# Patient Record
Sex: MALE | Race: WHITE | URBAN - METROPOLITAN AREA
[De-identification: names, ages, dates, MRNs, and addresses within clinical notes are randomized per-mention and may not be internally consistent; named-entity substitution may affect disease eponyms.]

---

## 2019-07-31 ENCOUNTER — HISTORICAL (OUTPATIENT)
Dept: RADIOLOGY | Facility: HOSPITAL | Age: 64
End: 2019-07-31

## 2024-06-04 ENCOUNTER — HOSPITAL ENCOUNTER (OUTPATIENT)
Facility: HOSPITAL | Age: 69
Discharge: HOME | End: 2024-06-04
Attending: INTERNAL MEDICINE | Admitting: INTERNAL MEDICINE
Payer: MEDICARE

## 2024-06-04 DIAGNOSIS — I35.0 AORTIC STENOSIS: Primary | ICD-10-CM

## 2024-06-04 LAB
CATH EF QUANTITATIVE: 65 %
OHS QRS DURATION: 136 MS
OHS QTC CALCULATION: 464 MS

## 2024-06-04 PROCEDURE — C1894 INTRO/SHEATH, NON-LASER: HCPCS | Performed by: INTERNAL MEDICINE

## 2024-06-04 PROCEDURE — 99153 MOD SED SAME PHYS/QHP EA: CPT | Performed by: INTERNAL MEDICINE

## 2024-06-04 PROCEDURE — 27201423 OPTIME MED/SURG SUP & DEVICES STERILE SUPPLY: Performed by: INTERNAL MEDICINE

## 2024-06-04 PROCEDURE — 99152 MOD SED SAME PHYS/QHP 5/>YRS: CPT | Performed by: INTERNAL MEDICINE

## 2024-06-04 PROCEDURE — 93010 ELECTROCARDIOGRAM REPORT: CPT | Mod: ,,, | Performed by: INTERNAL MEDICINE

## 2024-06-04 PROCEDURE — 25000003 PHARM REV CODE 250: Performed by: INTERNAL MEDICINE

## 2024-06-04 PROCEDURE — 93005 ELECTROCARDIOGRAM TRACING: CPT | Mod: 59

## 2024-06-04 PROCEDURE — 93567 NJX CAR CTH SPRVLV AORTGRPHY: CPT | Performed by: INTERNAL MEDICINE

## 2024-06-04 PROCEDURE — C1751 CATH, INF, PER/CENT/MIDLINE: HCPCS | Performed by: INTERNAL MEDICINE

## 2024-06-04 PROCEDURE — C1760 CLOSURE DEV, VASC: HCPCS | Performed by: INTERNAL MEDICINE

## 2024-06-04 PROCEDURE — 63600175 PHARM REV CODE 636 W HCPCS: Performed by: INTERNAL MEDICINE

## 2024-06-04 PROCEDURE — C1769 GUIDE WIRE: HCPCS | Performed by: INTERNAL MEDICINE

## 2024-06-04 PROCEDURE — 93460 R&L HRT ART/VENTRICLE ANGIO: CPT | Performed by: INTERNAL MEDICINE

## 2024-06-04 PROCEDURE — 25500020 PHARM REV CODE 255: Performed by: INTERNAL MEDICINE

## 2024-06-04 PROCEDURE — 93458 L HRT ARTERY/VENTRICLE ANGIO: CPT | Performed by: INTERNAL MEDICINE

## 2024-06-04 DEVICE — ANGIO-SEAL VIP VASCULAR CLOSURE DEVICE
Type: IMPLANTABLE DEVICE | Site: GROIN | Status: FUNCTIONAL
Brand: ANGIO-SEAL

## 2024-06-04 RX ORDER — ROSUVASTATIN CALCIUM 5 MG/1
5 TABLET, COATED ORAL DAILY
COMMUNITY
Start: 2024-04-11

## 2024-06-04 RX ORDER — MV/FA/DHA/EPA/FISH OIL/SAW/GNK 400MCG-200
1 COMBINATION PACKAGE (EA) ORAL
COMMUNITY

## 2024-06-04 RX ORDER — GUAIFENESIN 400 MG/1
400 TABLET ORAL DAILY PRN
COMMUNITY

## 2024-06-04 RX ORDER — UBIDECARENONE 75 MG
500 CAPSULE ORAL DAILY
COMMUNITY

## 2024-06-04 RX ORDER — ZINC GLUCONATE 50 MG
50 TABLET ORAL DAILY
COMMUNITY

## 2024-06-04 RX ORDER — SODIUM CHLORIDE 9 MG/ML
INJECTION, SOLUTION INTRAVENOUS CONTINUOUS
Status: DISCONTINUED | OUTPATIENT
Start: 2024-06-04 | End: 2024-06-04 | Stop reason: HOSPADM

## 2024-06-04 RX ORDER — MIDAZOLAM HYDROCHLORIDE 2 MG/2ML
INJECTION, SOLUTION INTRAMUSCULAR; INTRAVENOUS
Status: DISCONTINUED | OUTPATIENT
Start: 2024-06-04 | End: 2024-06-04 | Stop reason: HOSPADM

## 2024-06-04 RX ORDER — LISINOPRIL 10 MG/1
10 TABLET ORAL DAILY
COMMUNITY
Start: 2024-03-08

## 2024-06-04 RX ORDER — LIDOCAINE HYDROCHLORIDE 10 MG/ML
INJECTION INFILTRATION; PERINEURAL
Status: DISCONTINUED | OUTPATIENT
Start: 2024-06-04 | End: 2024-06-04 | Stop reason: HOSPADM

## 2024-06-04 RX ORDER — CEFAZOLIN SODIUM 1 G/3ML
INJECTION, POWDER, FOR SOLUTION INTRAMUSCULAR; INTRAVENOUS
Status: DISCONTINUED | OUTPATIENT
Start: 2024-06-04 | End: 2024-06-04 | Stop reason: HOSPADM

## 2024-06-04 RX ORDER — APREMILAST 30 MG/1
1 TABLET, FILM COATED ORAL
COMMUNITY

## 2024-06-04 RX ORDER — FENTANYL CITRATE 50 UG/ML
INJECTION, SOLUTION INTRAMUSCULAR; INTRAVENOUS
Status: DISCONTINUED | OUTPATIENT
Start: 2024-06-04 | End: 2024-06-04 | Stop reason: HOSPADM

## 2024-06-04 RX ORDER — SODIUM CHLORIDE 0.9 % (FLUSH) 0.9 %
10 SYRINGE (ML) INJECTION
Status: DISCONTINUED | OUTPATIENT
Start: 2024-06-04 | End: 2024-06-04 | Stop reason: HOSPADM

## 2024-06-04 RX ORDER — ATROPINE SULFATE 0.1 MG/ML
INJECTION INTRAVENOUS
Status: DISCONTINUED
Start: 2024-06-04 | End: 2024-06-04 | Stop reason: HOSPADM

## 2024-06-04 RX ORDER — ACETAMINOPHEN 325 MG/1
650 TABLET ORAL EVERY 4 HOURS PRN
Status: DISCONTINUED | OUTPATIENT
Start: 2024-06-04 | End: 2024-06-04 | Stop reason: HOSPADM

## 2024-06-04 RX ORDER — ASPIRIN 81 MG/1
81 TABLET ORAL DAILY
Status: DISCONTINUED | OUTPATIENT
Start: 2024-06-05 | End: 2024-06-04 | Stop reason: HOSPADM

## 2024-06-04 RX ORDER — DIPHENHYDRAMINE HCL 25 MG
50 CAPSULE ORAL
Status: DISCONTINUED | OUTPATIENT
Start: 2024-06-04 | End: 2024-06-04 | Stop reason: HOSPADM

## 2024-06-04 RX ORDER — DIPHENHYDRAMINE HYDROCHLORIDE 50 MG/ML
INJECTION INTRAMUSCULAR; INTRAVENOUS
Status: DISCONTINUED | OUTPATIENT
Start: 2024-06-04 | End: 2024-06-04 | Stop reason: HOSPADM

## 2024-06-04 RX ORDER — SODIUM CHLORIDE 9 MG/ML
INJECTION, SOLUTION INTRAVENOUS ONCE
Status: COMPLETED | OUTPATIENT
Start: 2024-06-04 | End: 2024-06-04

## 2024-06-04 RX ORDER — PANTOPRAZOLE SODIUM 40 MG/1
40 TABLET, DELAYED RELEASE ORAL DAILY
COMMUNITY
Start: 2024-05-26

## 2024-06-04 RX ORDER — DIAZEPAM 5 MG/1
10 TABLET ORAL
Status: DISCONTINUED | OUTPATIENT
Start: 2024-06-04 | End: 2024-06-04 | Stop reason: HOSPADM

## 2024-06-04 RX ORDER — MAGNESIUM 200 MG
2 TABLET ORAL 2 TIMES DAILY PRN
COMMUNITY

## 2024-06-04 RX ORDER — MULTIVITAMIN
1 TABLET ORAL DAILY
COMMUNITY

## 2024-06-04 RX ORDER — ONDANSETRON 4 MG/1
8 TABLET, ORALLY DISINTEGRATING ORAL EVERY 8 HOURS PRN
Status: DISCONTINUED | OUTPATIENT
Start: 2024-06-04 | End: 2024-06-04 | Stop reason: HOSPADM

## 2024-06-04 RX ORDER — HEPARIN SODIUM 1000 [USP'U]/ML
INJECTION, SOLUTION INTRAVENOUS; SUBCUTANEOUS
Status: DISCONTINUED | OUTPATIENT
Start: 2024-06-04 | End: 2024-06-04 | Stop reason: HOSPADM

## 2024-06-04 RX ADMIN — SODIUM CHLORIDE: 9 INJECTION, SOLUTION INTRAVENOUS at 12:06

## 2024-06-04 RX ADMIN — DIPHENHYDRAMINE HYDROCHLORIDE 50 MG: 25 CAPSULE ORAL at 01:06

## 2024-06-04 RX ADMIN — DIAZEPAM 10 MG: 5 TABLET ORAL at 01:06

## 2024-06-04 NOTE — Clinical Note
The catheter was inserted into the, was removed from the and was inserted over the wire into the aorta and left ventricle. Hemodynamics were performed.  and Pullback was recorded.

## 2024-06-04 NOTE — H&P
Patient name: Lito Durham  MRN: [id number]  : 1955  Cath Lab Procedure H&P Update    Pre-Procedure Assessment:    I saw and examined the patient face to face. The patient has been re-evaluated and his condition is unchanged. The reason for admission, procedure and care is still present.  Based on the patients H&P, pre-procedure physical exam, relevant diagnostic studies, NPO status and information obtained from the patient, I determine the patient is an appropriate candidate for the proposed procedure and anesthesia planned. I further certify the anesthesia risks, benefits and options have been explained to the patient to which he agrees as documented on the procedural consent.    See scanned updated H&P and additional records from media tab.      Rios Paulino

## 2024-06-04 NOTE — DISCHARGE INSTRUCTIONS
Can shower after 24 hours. Remove dressing in 24 hours.  No driving for 48 hours.  Do not submerge site in water for 5 days  No lotion, cream, powder around the site for 5 days.  No lifting more than a gallon of milk for 5 days.  Go to the nearest ER when there is redness, swelling or discharge from the site, increasing pain on the site and you run fever.  If the site bleeds, lay on the ground on put pressure on the right groin for  continuously for 10 minutes and call 911.  Continue medication as prescribed by your physician.

## 2024-06-04 NOTE — Clinical Note
The catheter was inserted into the and was inserted over the wire into the ostium   right coronary artery. Hemodynamics were performed.  An angiography was performed of the right coronary arteries. Multiple views were taken. The angiography was performed via power injection.

## 2024-06-04 NOTE — Clinical Note
The catheter was inserted into the, was removed from the and was inserted over the wire into the ostium   left main. Hemodynamics were performed.  and Pullback was recorded.  An angiography was performed of the left coronary arteries. Multiple views were taken. The angiography was performed via power injection.

## 2024-06-05 VITALS
BODY MASS INDEX: 34.75 KG/M2 | HEART RATE: 85 BPM | SYSTOLIC BLOOD PRESSURE: 115 MMHG | WEIGHT: 216.25 LBS | RESPIRATION RATE: 21 BRPM | HEIGHT: 66 IN | OXYGEN SATURATION: 97 % | DIASTOLIC BLOOD PRESSURE: 74 MMHG | TEMPERATURE: 97 F

## 2024-06-28 DIAGNOSIS — I35.0 AORTIC STENOSIS: Primary | ICD-10-CM

## 2024-07-24 RX ORDER — AMOXICILLIN 500 MG/1
1000 CAPSULE ORAL 2 TIMES DAILY
COMMUNITY
Start: 2024-07-10 | End: 2024-07-25

## 2024-07-25 ENCOUNTER — OFFICE VISIT (OUTPATIENT)
Dept: CARDIAC SURGERY | Facility: CLINIC | Age: 69
End: 2024-07-25
Payer: MEDICARE

## 2024-07-25 VITALS
OXYGEN SATURATION: 98 % | WEIGHT: 217.81 LBS | DIASTOLIC BLOOD PRESSURE: 73 MMHG | BODY MASS INDEX: 35.15 KG/M2 | SYSTOLIC BLOOD PRESSURE: 126 MMHG | HEART RATE: 100 BPM

## 2024-07-25 DIAGNOSIS — I35.0 SEVERE AORTIC STENOSIS: ICD-10-CM

## 2024-07-25 DIAGNOSIS — I35.0 AORTIC VALVE STENOSIS, ETIOLOGY OF CARDIAC VALVE DISEASE UNSPECIFIED: Primary | ICD-10-CM

## 2024-07-25 PROCEDURE — 99205 OFFICE O/P NEW HI 60 MIN: CPT | Mod: ,,, | Performed by: THORACIC SURGERY (CARDIOTHORACIC VASCULAR SURGERY)

## 2024-08-06 PROBLEM — I35.0 SEVERE AORTIC STENOSIS: Status: ACTIVE | Noted: 2024-08-06

## 2024-10-10 ENCOUNTER — OFFICE VISIT (OUTPATIENT)
Dept: CARDIAC SURGERY | Facility: CLINIC | Age: 69
End: 2024-10-10
Payer: MEDICARE

## 2024-10-10 VITALS
OXYGEN SATURATION: 97 % | SYSTOLIC BLOOD PRESSURE: 133 MMHG | BODY MASS INDEX: 35.36 KG/M2 | DIASTOLIC BLOOD PRESSURE: 85 MMHG | HEIGHT: 66 IN | WEIGHT: 220 LBS | HEART RATE: 98 BPM

## 2024-10-10 DIAGNOSIS — I35.0 AORTIC VALVE STENOSIS, ETIOLOGY OF CARDIAC VALVE DISEASE UNSPECIFIED: Primary | ICD-10-CM

## 2024-10-10 DIAGNOSIS — Z79.01 ADMISSION FOR LONG-TERM (CURRENT) USE OF ANTICOAGULANTS: ICD-10-CM

## 2024-10-10 DIAGNOSIS — I35.0 SEVERE AORTIC STENOSIS: ICD-10-CM

## 2024-10-10 DIAGNOSIS — Z51.81 ADMISSION FOR LONG-TERM (CURRENT) USE OF ANTICOAGULANTS: ICD-10-CM

## 2024-10-10 RX ORDER — MUPIROCIN 20 MG/G
OINTMENT TOPICAL
OUTPATIENT
Start: 2024-10-10 | End: 2024-10-10

## 2024-10-10 RX ORDER — HYDROCODONE BITARTRATE AND ACETAMINOPHEN 500; 5 MG/1; MG/1
TABLET ORAL
OUTPATIENT
Start: 2024-10-10

## 2024-10-10 RX ORDER — MUPIROCIN 20 MG/G
OINTMENT TOPICAL DAILY
Qty: 1 EACH | Refills: 0 | Status: SHIPPED | OUTPATIENT
Start: 2024-10-10 | End: 2024-10-12

## 2024-10-10 NOTE — H&P (VIEW-ONLY)
[facility]  Cardiothoracic Surgery  Consult Note    Patient Name: Lito Durham  MRN: [id number]  Date of Service: 10/11/2024  Referring Provider: No ref. provider found    Patient information was obtained from patient, past medical records, and primary team    Subjective:     Chief Complaint   Patient presents with    Pre-op Exam     DISCUSS AVR- READY TO SCHEDULE SURGERY  PT WAS NOT A TAVR CANDIDATE.  [facility] 6/4/24- NON OBST CAD, SEVERE AS *IN EPIC  ECHO 8/1/24  CAROTID US 6/18/24- ROBIN ICA 1-39%          History of Present Illness:  Patient is an active, very nice 68-year-old man who was recently found to have severe aortic stenosis with an echocardiogram on April 19, 2024 revealing a normal ejection fraction with an aortic valve area of 0.7 sq cm, a mean pressure gradient of 39 mmHg, a peak systolic velocity of 4.3 m/sec and moderate-to-severe aortic insufficiency with normal left ventricular dimensions.  Left heart catheterization confirmed normal ejection fraction with a low EDP of 7.  Patient had no significant flow-limiting coronary artery disease.  CTA reveals severe aortic stenosis with small aortic valve annulus consistent with the patient's stature and femoral access adequate for transcatheter aortic valve replacement consideration.    The patient denies fever, chills, nausea, vomiting, headache, syncope, chest pain, back pain, or shortness of breath.  He does complain of some dyspnea on heavy exertion    Current Outpatient Medications on File Prior to Visit   Medication Sig    cyanocobalamin (VITAMIN B-12) 500 MCG tablet Take 500 mcg by mouth. Once a week    guaiFENesin (MUCUS RELIEF) 400 mg Tab Take 400 mg by mouth daily as needed.    krill oil 500 mg Cap Take 1 capsule by mouth twice a week.    lisinopriL 10 MG tablet Take 10 mg by mouth once daily.    magnesium 200 mg Tab Take 2 tablets by mouth 2 (two) times daily as needed.    multivitamin (ONE DAILY MULTIVITAMIN) per  tablet Take 1 tablet by mouth once daily.    rosuvastatin (CRESTOR) 5 MG tablet Take 5 mg by mouth once daily.    zinc gluconate 50 mg tablet Take 50 mg by mouth once daily. W/Vit C    apremilast (OTEZLA) 30 mg Tab Take 1 tablet by mouth twice a week. (Patient not taking: Reported on 10/10/2024)     No current facility-administered medications on file prior to visit.       Review of patient's allergies indicates:  No Known Allergies    Past Medical History:   Diagnosis Date    Aortic stenosis     Cardiac murmur     Digestive disorder     Dizziness     Dyslipidemia     Eczema     Environmental allergies     Hypertension     Obesity     PONV (postoperative nausea and vomiting)     Psoriasis     Sleep apnea     cpap     Past Surgical History:   Procedure Laterality Date    ANKLE SURGERY Bilateral     CARPAL TUNNEL RELEASE Bilateral     CATARACT EXTRACTION Bilateral     CATHETERIZATION OF BOTH LEFT AND RIGHT HEART N/A 06/04/2024    Procedure: CATHETERIZATION, HEART, BOTH LEFT AND RIGHT;  Surgeon: Rios Paulino MD;  Location: [facility] CATH LAB;  Service: Cardiology;  Laterality: N/A;  [facility] VIA RT GROIN ACCESS    COLONOSCOPY      HERNIA REPAIR      double    THYROID SURGERY       Family History    None       Tobacco Use    Smoking status: Never    Smokeless tobacco: Never   Substance and Sexual Activity    Alcohol use: Yes     Comment: Occasionally    Drug use: Never    Sexual activity: Not on file     Review of Systems   Constitutional: Negative. Negative for chills, diaphoresis, fever and night sweats.   HENT:  Negative for hoarse voice, sore throat and stridor.    Eyes: Negative.  Negative for blurred vision and double vision.   Cardiovascular:  Positive for dyspnea on exertion. Negative for chest pain, claudication, cyanosis, irregular heartbeat, leg swelling, orthopnea, palpitations, paroxysmal nocturnal dyspnea and syncope.   Respiratory:  Negative for cough, hemoptysis, shortness of breath, sputum production and  wheezing.    Endocrine: Negative for polydipsia and polyuria.   Hematologic/Lymphatic: Negative for adenopathy and bleeding problem.   Gastrointestinal:  Negative for abdominal pain, diarrhea, heartburn, hematemesis, hematochezia, nausea and vomiting.   Neurological:  Negative for brief paralysis, disturbances in coordination, dizziness, focal weakness, headaches, numbness and paresthesias.     Objective:     Vitals:    10/10/24 1157   BP: 133/85   Pulse: 98        Weight: 99.8 kg (220 lb)  Body mass index is 35.51 kg/m².     STS Risk Score:  0.8%    Physical Exam  Vitals and nursing note reviewed.   Constitutional:       General: He is not in acute distress.     Appearance: Normal appearance.   HENT:      Head: Normocephalic and atraumatic.      Nose: No congestion or rhinorrhea.      Mouth/Throat:      Mouth: Mucous membranes are moist.      Pharynx: Oropharynx is clear. No oropharyngeal exudate or posterior oropharyngeal erythema.   Eyes:      Extraocular Movements: Extraocular movements intact.      Conjunctiva/sclera: Conjunctivae normal.      Pupils: Pupils are equal, round, and reactive to light.   Neck:      Thyroid: No thyroid mass.      Vascular: No carotid bruit or JVD.   Cardiovascular:      Rate and Rhythm: Normal rate and regular rhythm.      Pulses: Normal pulses.           Carotid pulses are 2+ on the right side and 2+ on the left side.       Radial pulses are 2+ on the right side and 2+ on the left side.        Femoral pulses are 2+ on the right side and 2+ on the left side.       Dorsalis pedis pulses are 2+ on the right side and 2+ on the left side.        Posterior tibial pulses are 2+ on the right side and 2+ on the left side.      Heart sounds: Murmur heard.      No friction rub. No gallop.   Pulmonary:      Effort: Pulmonary effort is normal. No respiratory distress.      Breath sounds: No wheezing, rhonchi or rales.   Chest:      Chest wall: No tenderness.   Abdominal:      General: Abdomen  is flat. Bowel sounds are normal. There is no distension.      Palpations: Abdomen is soft. There is no mass.      Tenderness: There is no abdominal tenderness.   Musculoskeletal:         General: No swelling. Normal range of motion.      Cervical back: Normal range of motion and neck supple.      Right lower leg: No edema.      Left lower leg: No edema.   Lymphadenopathy:      Cervical: No cervical adenopathy.      Upper Body:      Right upper body: No supraclavicular or axillary adenopathy.      Left upper body: No supraclavicular or axillary adenopathy.   Skin:     General: Skin is warm and dry.   Neurological:      General: No focal deficit present.      Mental Status: He is alert and oriented to person, place, and time.      Cranial Nerves: No cranial nerve deficit.      Sensory: No sensory deficit.      Motor: No weakness.   Psychiatric:         Mood and Affect: Mood normal.          Significant Labs:  Reviewed    Significant Diagnostics:  Reviewed    Assessment/Plan:     Problem List Items Addressed This Visit       Severe aortic stenosis     Recommend aortic valve replacement.  Patient would prefer to have surgical aortic valve replacement.  Considering the relatively small aortic valve annulus, may need root enlargement.  Surgical consideration would also be acceptable with annular enlargement and the process.  The patient desires a biological prosthesis due to various lifestyle choices.  Risks, benefits, and alternatives have been discussed.  Questions have been answered.  The patient voices understanding.  He would like to take a period of time to proceed             Other Visit Diagnoses       Aortic valve stenosis, etiology of cardiac valve disease unspecified    -  Primary    Relevant Medications    mupirocin (BACTROBAN) 2 % ointment    Other Relevant Orders    Case Request Operating Room: Replacement-valve-aortic, PLATING, STERNAL, Left atrial appendage occlusion (Completed)               Thank you  for your consult. I will follow-up with patient. Please contact us if you have any additional questions.    ABDI Munoz MD, FACC, FACS  Cardiothoracic Surgery  [facility]

## 2024-10-10 NOTE — PROGRESS NOTES
Heart and V[facility] Center [facility]  Cardiothoracic Surgery  Consult Note    Patient Name: Lito Durham  MRN: [id number]  Date of Service: 10/11/2024  Referring Provider: No ref. provider found    Patient information was obtained from patient, past medical records, and primary team    Subjective:     Chief Complaint   Patient presents with    Pre-op Exam     DISCUSS AVR- READY TO SCHEDULE SURGERY  PT WAS NOT A TAVR CANDIDATE.  [facility] 6/4/24- NON OBST CAD, SEVERE AS *IN EPIC  ECHO 8/1/24  CAROTID US 6/18/24- ROBIN ICA 1-39%          History of Present Illness:  Patient is an active, very nice 68-year-old man who was recently found to have severe aortic stenosis with an echocardiogram on April 19, 2024 revealing a normal ejection fraction with an aortic valve area of 0.7 sq cm, a mean pressure gradient of 39 mmHg, a peak systolic velocity of 4.3 m/sec and moderate-to-severe aortic insufficiency with normal left ventricular dimensions.  Left heart catheterization confirmed normal ejection fraction with a low EDP of 7.  Patient had no significant flow-limiting coronary artery disease.  CTA reveals severe aortic stenosis with small aortic valve annulus consistent with the patient's stature and femoral access adequate for transcatheter aortic valve replacement consideration.    The patient denies fever, chills, nausea, vomiting, headache, syncope, chest pain, back pain, or shortness of breath.  He does complain of some dyspnea on heavy exertion    Current Outpatient Medications on File Prior to Visit   Medication Sig    cyanocobalamin (VITAMIN B-12) 500 MCG tablet Take 500 mcg by mouth. Once a week    guaiFENesin (MUCUS RELIEF) 400 mg Tab Take 400 mg by mouth daily as needed.    krill oil 500 mg Cap Take 1 capsule by mouth twice a week.    lisinopriL 10 MG tablet Take 10 mg by mouth once daily.    magnesium 200 mg Tab Take 2 tablets by mouth 2 (two) times daily as needed.    multivitamin (ONE DAILY MULTIVITAMIN) per  tablet Take 1 tablet by mouth once daily.    rosuvastatin (CRESTOR) 5 MG tablet Take 5 mg by mouth once daily.    zinc gluconate 50 mg tablet Take 50 mg by mouth once daily. W/Vit C    apremilast (OTEZLA) 30 mg Tab Take 1 tablet by mouth twice a week. (Patient not taking: Reported on 10/10/2024)     No current facility-administered medications on file prior to visit.       Review of patient's allergies indicates:  No Known Allergies    Past Medical History:   Diagnosis Date    Aortic stenosis     Cardiac murmur     Digestive disorder     Dizziness     Dyslipidemia     Eczema     Environmental allergies     Hypertension     Obesity     PONV (postoperative nausea and vomiting)     Psoriasis     Sleep apnea     cpap     Past Surgical History:   Procedure Laterality Date    ANKLE SURGERY Bilateral     CARPAL TUNNEL RELEASE Bilateral     CATARACT EXTRACTION Bilateral     CATHETERIZATION OF BOTH LEFT AND RIGHT HEART N/A 06/04/2024    Procedure: CATHETERIZATION, HEART, BOTH LEFT AND RIGHT;  Surgeon: Rios Paulino MD;  Location: [facility] CATH LAB;  Service: Cardiology;  Laterality: N/A;  [facility]HC VIA RT GROIN ACCESS    COLONOSCOPY      HERNIA REPAIR      double    THYROID SURGERY       Family History    None       Tobacco Use    Smoking status: Never    Smokeless tobacco: Never   Substance and Sexual Activity    Alcohol use: Yes     Comment: Occasionally    Drug use: Never    Sexual activity: Not on file     Review of Systems   Constitutional: Negative. Negative for chills, diaphoresis, fever and night sweats.   HENT:  Negative for hoarse voice, sore throat and stridor.    Eyes: Negative.  Negative for blurred vision and double vision.   Cardiovascular:  Positive for dyspnea on exertion. Negative for chest pain, claudication, cyanosis, irregular heartbeat, leg swelling, orthopnea, palpitations, paroxysmal nocturnal dyspnea and syncope.   Respiratory:  Negative for cough, hemoptysis, shortness of breath, sputum production and  wheezing.    Endocrine: Negative for polydipsia and polyuria.   Hematologic/Lymphatic: Negative for adenopathy and bleeding problem.   Gastrointestinal:  Negative for abdominal pain, diarrhea, heartburn, hematemesis, hematochezia, nausea and vomiting.   Neurological:  Negative for brief paralysis, disturbances in coordination, dizziness, focal weakness, headaches, numbness and paresthesias.     Objective:     Vitals:    10/10/24 1157   BP: 133/85   Pulse: 98        Weight: 99.8 kg (220 lb)  Body mass index is 35.51 kg/m².     STS Risk Score:  0.8%    Physical Exam  Vitals and nursing note reviewed.   Constitutional:       General: He is not in acute distress.     Appearance: Normal appearance.   HENT:      Head: Normocephalic and atraumatic.      Nose: No congestion or rhinorrhea.      Mouth/Throat:      Mouth: Mucous membranes are moist.      Pharynx: Oropharynx is clear. No oropharyngeal exudate or posterior oropharyngeal erythema.   Eyes:      Extraocular Movements: Extraocular movements intact.      Conjunctiva/sclera: Conjunctivae normal.      Pupils: Pupils are equal, round, and reactive to light.   Neck:      Thyroid: No thyroid mass.      Vascular: No carotid bruit or JVD.   Cardiovascular:      Rate and Rhythm: Normal rate and regular rhythm.      Pulses: Normal pulses.           Carotid pulses are 2+ on the right side and 2+ on the left side.       Radial pulses are 2+ on the right side and 2+ on the left side.        Femoral pulses are 2+ on the right side and 2+ on the left side.       Dorsalis pedis pulses are 2+ on the right side and 2+ on the left side.        Posterior tibial pulses are 2+ on the right side and 2+ on the left side.      Heart sounds: Murmur heard.      No friction rub. No gallop.   Pulmonary:      Effort: Pulmonary effort is normal. No respiratory distress.      Breath sounds: No wheezing, rhonchi or rales.   Chest:      Chest wall: No tenderness.   Abdominal:      General: Abdomen  is flat. Bowel sounds are normal. There is no distension.      Palpations: Abdomen is soft. There is no mass.      Tenderness: There is no abdominal tenderness.   Musculoskeletal:         General: No swelling. Normal range of motion.      Cervical back: Normal range of motion and neck supple.      Right lower leg: No edema.      Left lower leg: No edema.   Lymphadenopathy:      Cervical: No cervical adenopathy.      Upper Body:      Right upper body: No supraclavicular or axillary adenopathy.      Left upper body: No supraclavicular or axillary adenopathy.   Skin:     General: Skin is warm and dry.   Neurological:      General: No focal deficit present.      Mental Status: He is alert and oriented to person, place, and time.      Cranial Nerves: No cranial nerve deficit.      Sensory: No sensory deficit.      Motor: No weakness.   Psychiatric:         Mood and Affect: Mood normal.          Significant Labs:  Reviewed    Significant Diagnostics:  Reviewed    Assessment/Plan:     Problem List Items Addressed This Visit       Severe aortic stenosis     Recommend aortic valve replacement.  Patient would prefer to have surgical aortic valve replacement.  Considering the relatively small aortic valve annulus, may need root enlargement.  Surgical consideration would also be acceptable with annular enlargement and the process.  The patient desires a biological prosthesis due to various lifestyle choices.  Risks, benefits, and alternatives have been discussed.  Questions have been answered.  The patient voices understanding.  He would like to take a period of time to proceed             Other Visit Diagnoses       Aortic valve stenosis, etiology of cardiac valve disease unspecified    -  Primary    Relevant Medications    mupirocin (BACTROBAN) 2 % ointment    Other Relevant Orders    Case Request Operating Room: Replacement-valve-aortic, PLATING, STERNAL, Left atrial appendage occlusion (Completed)               Thank you  for your consult. I will follow-up with patient. Please contact us if you have any additional questions.    ABDI Munoz MD, FACC, FACS  Cardiothoracic Surgery  [facility]

## 2024-10-11 NOTE — ASSESSMENT & PLAN NOTE
Recommend aortic valve replacement.  Patient would prefer to have surgical aortic valve replacement.  Considering the relatively small aortic valve annulus, may need root enlargement.  Surgical consideration would also be acceptable with annular enlargement and the process.  The patient desires a biological prosthesis due to various lifestyle choices.  Risks, benefits, and alternatives have been discussed.  Questions have been answered.  The patient voices understanding.  He would like to take a period of time to proceed

## 2024-10-15 ENCOUNTER — ANESTHESIA EVENT (OUTPATIENT)
Dept: SURGERY | Facility: HOSPITAL | Age: 69
End: 2024-10-15
Payer: MEDICARE

## 2024-10-17 RX ORDER — PANTOPRAZOLE SODIUM 20 MG/1
20 TABLET, DELAYED RELEASE ORAL
COMMUNITY

## 2024-10-28 ENCOUNTER — HOSPITAL ENCOUNTER (OUTPATIENT)
Dept: RADIOLOGY | Facility: HOSPITAL | Age: 69
Discharge: HOME | End: 2024-10-28
Attending: THORACIC SURGERY (CARDIOTHORACIC VASCULAR SURGERY)
Payer: MEDICARE

## 2024-10-28 DIAGNOSIS — I35.0 AORTIC VALVE STENOSIS, ETIOLOGY OF CARDIAC VALVE DISEASE UNSPECIFIED: ICD-10-CM

## 2024-10-28 PROCEDURE — 71046 X-RAY EXAM CHEST 2 VIEWS: CPT | Mod: TC

## 2024-10-31 NOTE — PRE-PROCEDURE INSTRUCTIONS
"[facility]: Outpatient Surgery  Preprocedure Check-In Instructions     Your arrival time for your surgery or procedure is 5am.  We ask patients to arrive about 2 hours before surgery to allow for enough time to review your health history & medications, start your IV, complete any outstanding labwork or tests, and meet your Anesthesiologist.    Expectations: "Because of inconsistent procedure completion times, an unexpected wait may occur. The Physicians would like you to be here to prepare in the event they run ahead of time. We will make you as comfortable as possible and keep you informed. We apologize in advance if this happens."    You will arrive at [facility], [address], [place].  Enter through the [place] entrance next to the Emergency Room, and come to the 6th floor to the Outpatient Surgery Department.     Visitory Policy:  You are allowed 2 adult visitors to be with you in the hospital. All hospital visitors should be in good current health.  No small children.     What to Bring:  Please have your ID, insurance cards, and all home medication bottles with you at check in.  Bring your CPAP machine if one is used at home.     Fasting:  Nothing to eat after midnight the night before your procedure. This includes no gum and/or tobacco products. You may have clear liquids limited to only: WATER, GATORADE, POWERADE and children can also have PEDIALYTE AND/OR APPLE JUICE , up until 2 hours before your arrival time.   Follow your doctor's instructions for taking any medications on the morning of your procedure.  If no instructions for taking medications were given, do not take any medications but bring your medications in their bottles to your procedure check in.     Follow your doctor's preoperative instructions regarding skin prep, bowel prep, bathing, or showering prior to your procedure.  If any special soaps were provided to you, please use according to your " doctor's instructions. If no instructions were given from your doctor, take a good bath or shower with antibacterial soap the night before and the morning of your procedure.  On the morning of procedure, wear loose, comfortable clothing.  No lotions, makeup, perfumes, colognes, deodorant, or jewelry to your procedure.  Removable items (glasses, contact lenses, dentures, retainers, hearing aids) need to be removed for your procedure.  Bring your storage containers for these items if you wear them.     Artificial nails, body jewelry, eyelash extensions, and/or hair extensions with metal clips are not allowed during your surgery.  If you currently wear any of these items, please arrange for them to be removed prior to your arrival to the hospital.     Outpatient or Same Day Surgeries:  Any patients receiving sedation/anesthesia are advised not to drive for 24 hours after their procedure.  We do not allow patients to drive themselves home after discharge.  If you are going home after your procedure, please have someone available to drive you home from the hospital.        You may call the Outpatient Surgery Department at [phone] with any questions or concerns.  We are looking forward to meeting you and taking great care of you for your procedure.  Thank you for choosing [facility] for your surgical needs.

## 2024-10-31 NOTE — CLINICAL REVIEW
labs, CXR, EKG reviewed. Awaiting cardiology document. sd //     cardiology notes utd. Echo/Carotid/Angio noted. chart review complete. ccv

## 2024-11-01 ENCOUNTER — ANESTHESIA (OUTPATIENT)
Dept: SURGERY | Facility: HOSPITAL | Age: 69
End: 2024-11-01
Payer: MEDICARE

## 2024-11-01 NOTE — PRE-PROCEDURE INSTRUCTIONS
"[facility]: Outpatient Surgery  Preprocedure Check-In Instructions     Your physician's office called you with your arrival time.   We ask patients to arrive about 2 hours before surgery to allow for enough time to review your health history & medications, start your IV, complete any outstanding labwork or tests, and meet your Anesthesiologist.    Expectations: "Because of inconsistent procedure completion times, an unexpected wait may occur. The Physicians would like you to be here to prepare in the event they run ahead of time. We will make you as comfortable as possible and keep you informed. We apologize in advance if this happens."    You will arrive at [facility], [address], [place].  Enter through the [address] entrance next to the Emergency Room, and come to the 6th floor to the Outpatient Surgery Department.     Fasting:  Nothing to eat  and SOLID FOODS after midnight the night before your procedure. This includes no gum and/or tobacco products. You may have CLEAR liquids limited to only: WATER, GATORADE, POWERADE up until 2 hours before your arrival time.   Follow your doctor's instructions for taking any medications on the morning of your procedure.  If no instructions for taking medications were given, do not take any medications but bring your medications in their bottles to your procedure check in.    Visitory Policy:  You are allowed 2 adult visitors to be with you in the hospital. All hospital visitors should be in good current health.  No small children.     What to Bring:  Please have your ID, insurance cards, and all home medication bottles with you at check in.  Bring your CPAP machine if one is used at home.        Follow your doctor's preoperative instructions regarding skin prep, bowel prep, bathing, or showering prior to your procedure.  If any special soaps were provided to you, please use according to your doctor's instructions. If no " instructions were given from your doctor, take a good bath or shower with antibacterial soap the night before and the morning of your procedure.  On the morning of procedure, wear loose, comfortable clothing.  No lotions, makeup, perfumes, colognes, deodorant, or jewelry to your procedure.  Removable items (glasses, contact lenses, dentures, retainers, hearing aids) need to be removed for your procedure.  Bring your storage containers for these items if you wear them.     Artificial nails, body jewelry, eyelash extensions, and/or hair extensions with metal clips are not allowed during your surgery.  If you currently wear any of these items, please arrange for them to be removed prior to your arrival to the hospital.     Outpatient or Same Day Surgeries:  Any patients receiving sedation/anesthesia are advised not to drive for 24 hours after their procedure.  We do not allow patients to drive themselves home after discharge.  If you are going home after your procedure, please have someone available to drive you home from the hospital.        You may call the Outpatient Surgery Department at [phone] with any questions or concerns.  We are looking forward to meeting you and taking great care of you for your procedure.  Thank you for choosing [facility] for your surgical needs.

## 2024-11-04 ENCOUNTER — HOSPITAL ENCOUNTER (INPATIENT)
Facility: HOSPITAL | Age: 69
LOS: 3 days | Discharge: HOME HEALTH | DRG: 221 | End: 2024-11-07
Attending: THORACIC SURGERY (CARDIOTHORACIC VASCULAR SURGERY) | Admitting: THORACIC SURGERY (CARDIOTHORACIC VASCULAR SURGERY)
Payer: MEDICARE

## 2024-11-04 DIAGNOSIS — I35.0 SEVERE AORTIC STENOSIS: Primary | ICD-10-CM

## 2024-11-04 DIAGNOSIS — I25.10 CAD (CORONARY ARTERY DISEASE): ICD-10-CM

## 2024-11-04 DIAGNOSIS — I35.0 AORTIC VALVE STENOSIS, ETIOLOGY OF CARDIAC VALVE DISEASE UNSPECIFIED: ICD-10-CM

## 2024-11-04 DIAGNOSIS — I35.0 AORTIC STENOSIS: ICD-10-CM

## 2024-11-04 LAB
ABO + RH BLD: NORMAL
ABO + RH BLD: NORMAL
ALBUMIN SERPL-MCNC: 3.1 G/DL (ref 3.4–4.8)
ALBUMIN/GLOB SERPL: 1.4 RATIO (ref 1.1–2)
ALLENS TEST BLOOD GAS (OHS): ABNORMAL
ALLENS TEST BLOOD GAS (OHS): ABNORMAL
ALLENS TEST BLOOD GAS (OHS): YES
ALP SERPL-CCNC: 38 UNIT/L (ref 40–150)
ALT SERPL-CCNC: 15 UNIT/L (ref 0–55)
ANION GAP SERPL CALC-SCNC: 10 MEQ/L
ANION GAP SERPL CALC-SCNC: 9 MEQ/L
APTT PPP: 23.7 SECONDS (ref 23.2–33.7)
APTT PPP: 25.6 SECONDS (ref 23.2–33.7)
AST SERPL-CCNC: 35 UNIT/L (ref 5–34)
BASE EXCESS BLD CALC-SCNC: 0.3 MMOL/L (ref -2–2)
BASE EXCESS BLD CALC-SCNC: 2.5 MMOL/L
BASE EXCESS BLD CALC-SCNC: 4 MMOL/L
BASOPHILS # BLD AUTO: 0.08 X10(3)/MCL
BASOPHILS NFR BLD AUTO: 0.5 %
BILIRUB SERPL-MCNC: 1.7 MG/DL
BLD PROD TYP BPU: NORMAL
BLD PROD TYP BPU: NORMAL
BLOOD GAS SAMPLE TYPE (OHS): ABNORMAL
BLOOD UNIT EXPIRATION DATE: NORMAL
BLOOD UNIT EXPIRATION DATE: NORMAL
BLOOD UNIT TYPE CODE: 5100
BLOOD UNIT TYPE CODE: 5100
BSA FOR ECHO PROCEDURE: 2.12 M2
BUN SERPL-MCNC: 13.9 MG/DL (ref 8.4–25.7)
BUN SERPL-MCNC: 15 MG/DL (ref 8.4–25.7)
CA-I BLD-SCNC: 1.02 MMOL/L (ref 1.12–1.23)
CA-I BLD-SCNC: 1.08 MMOL/L (ref 1.12–1.23)
CA-I BLD-SCNC: 1.1 MMOL/L (ref 1.12–1.23)
CALCIUM SERPL-MCNC: 10.2 MG/DL (ref 8.8–10)
CALCIUM SERPL-MCNC: 8.3 MG/DL (ref 8.8–10)
CHLORIDE SERPL-SCNC: 106 MMOL/L (ref 98–107)
CHLORIDE SERPL-SCNC: 108 MMOL/L (ref 98–107)
CO2 BLDA-SCNC: 25.8 MMOL/L
CO2 BLDA-SCNC: 27.4 MMOL/L
CO2 BLDA-SCNC: 28.5 MMOL/L
CO2 SERPL-SCNC: 22 MMOL/L (ref 23–31)
CO2 SERPL-SCNC: 24 MMOL/L (ref 23–31)
COHGB MFR BLDA: 1.5 % (ref 0.5–1.5)
CREAT SERPL-MCNC: 0.66 MG/DL (ref 0.72–1.25)
CREAT SERPL-MCNC: 0.67 MG/DL (ref 0.72–1.25)
CREAT/UREA NIT SERPL: 21
CREAT/UREA NIT SERPL: 23
CROSSMATCH INTERPRETATION: NORMAL
CROSSMATCH INTERPRETATION: NORMAL
DISPENSE STATUS: NORMAL
DISPENSE STATUS: NORMAL
DRAWN BY BLOOD GAS (OHS): ABNORMAL
EOSINOPHIL # BLD AUTO: 0.33 X10(3)/MCL (ref 0–0.9)
EOSINOPHIL NFR BLD AUTO: 2.3 %
ERYTHROCYTE [DISTWIDTH] IN BLOOD BY AUTOMATED COUNT: 13.2 % (ref 11.5–17)
ERYTHROCYTE [DISTWIDTH] IN BLOOD BY AUTOMATED COUNT: 13.3 % (ref 11.5–17)
FIO2: 100
FIO2: 55
GFR SERPLBLD CREATININE-BSD FMLA CKD-EPI: >60 ML/MIN/1.73/M2
GFR SERPLBLD CREATININE-BSD FMLA CKD-EPI: >60 ML/MIN/1.73/M2
GLOBULIN SER-MCNC: 2.2 GM/DL (ref 2.4–3.5)
GLUCOSE SERPL-MCNC: 104 MG/DL (ref 70–110)
GLUCOSE SERPL-MCNC: 125 MG/DL (ref 70–110)
GLUCOSE SERPL-MCNC: 126 MG/DL (ref 82–115)
GLUCOSE SERPL-MCNC: 144 MG/DL (ref 70–110)
GLUCOSE SERPL-MCNC: 147 MG/DL (ref 70–110)
GLUCOSE SERPL-MCNC: 155 MG/DL (ref 82–115)
GLUCOSE SERPL-MCNC: 163 MG/DL (ref 70–110)
GLUCOSE SERPL-MCNC: 166 MG/DL (ref 70–110)
GLUCOSE SERPL-MCNC: 174 MG/DL (ref 70–110)
HCO3 BLDA-SCNC: 24.6 MMOL/L (ref 22–26)
HCO3 BLDA-SCNC: 26.3 MMOL/L (ref 22–26)
HCO3 BLDA-SCNC: 27.4 MMOL/L (ref 22–26)
HCO3 UR-SCNC: 23.6 MMOL/L (ref 24–28)
HCO3 UR-SCNC: 23.8 MMOL/L (ref 24–28)
HCO3 UR-SCNC: 24.5 MMOL/L (ref 24–28)
HCO3 UR-SCNC: 24.9 MMOL/L (ref 24–28)
HCO3 UR-SCNC: 25.1 MMOL/L (ref 24–28)
HCO3 UR-SCNC: 26.2 MMOL/L (ref 24–28)
HCT VFR BLD AUTO: 34.1 % (ref 42–52)
HCT VFR BLD AUTO: 34.9 % (ref 42–52)
HCT VFR BLD AUTO: 41.5 % (ref 42–52)
HCT VFR BLD CALC: 30 %PCV (ref 36–54)
HCT VFR BLD CALC: 30 %PCV (ref 36–54)
HCT VFR BLD CALC: 31 %PCV (ref 36–54)
HCT VFR BLD CALC: 34 %PCV (ref 36–54)
HCT VFR BLD CALC: 38 %PCV (ref 36–54)
HCT VFR BLD CALC: 39 %PCV (ref 36–54)
HGB BLD-MCNC: 10 G/DL
HGB BLD-MCNC: 10 G/DL
HGB BLD-MCNC: 11 G/DL
HGB BLD-MCNC: 11.6 G/DL (ref 14–18)
HGB BLD-MCNC: 11.8 G/DL (ref 14–18)
HGB BLD-MCNC: 12 G/DL
HGB BLD-MCNC: 13 G/DL
HGB BLD-MCNC: 13 G/DL
HGB BLD-MCNC: 13.5 G/DL (ref 14–18)
IMM GRANULOCYTES # BLD AUTO: 0.08 X10(3)/MCL (ref 0–0.04)
IMM GRANULOCYTES NFR BLD AUTO: 0.5 %
INHALED O2 CONCENTRATION: 21 %
INHALED O2 CONCENTRATION: 35 %
INHALED O2 CONCENTRATION: 60 %
INR PPP: 1.3
INR PPP: 1.4
LYMPHOCYTES # BLD AUTO: 1.58 X10(3)/MCL (ref 0.6–4.6)
LYMPHOCYTES NFR BLD AUTO: 10.8 %
MAGNESIUM SERPL-MCNC: 2.6 MG/DL (ref 1.6–2.6)
MCH RBC QN AUTO: 29.4 PG (ref 27–31)
MCH RBC QN AUTO: 30.1 PG (ref 27–31)
MCHC RBC AUTO-ENTMCNC: 32.5 G/DL (ref 33–36)
MCHC RBC AUTO-ENTMCNC: 33.8 G/DL (ref 33–36)
MCV RBC AUTO: 89 FL (ref 80–94)
MCV RBC AUTO: 90.4 FL (ref 80–94)
MECH RR (OHS): 20 B/MIN
METHGB MFR BLDA: 0.7 % (ref 0.4–1.5)
MODE (OHS): ABNORMAL
MODE (OHS): ABNORMAL
MONOCYTES # BLD AUTO: 0.54 X10(3)/MCL (ref 0.1–1.3)
MONOCYTES NFR BLD AUTO: 3.7 %
NEUTROPHILS # BLD AUTO: 12.01 X10(3)/MCL (ref 2.1–9.2)
NEUTROPHILS NFR BLD AUTO: 82.2 %
NRBC BLD AUTO-RTO: 0 %
NRBC BLD AUTO-RTO: 0 %
O2 HB BLOOD GAS (OHS): 94.6 % (ref 94–97)
OXYGEN DEVICE BLOOD GAS (OHS): ABNORMAL
OXYGEN DEVICE BLOOD GAS (OHS): ABNORMAL
OXYHGB MFR BLDA: 14.4 G/DL (ref 12–16)
PCO2 BLDA: 36 MMHG (ref 35–45)
PCO2 BLDA: 36.7 MMHG (ref 35–45)
PCO2 BLDA: 37 MMHG (ref 35–45)
PCO2 BLDA: 38 MMHG (ref 35–45)
PCO2 BLDA: 41.1 MMHG (ref 35–45)
PCO2 BLDA: 41.4 MMHG (ref 35–45)
PCO2 BLDA: 42.5 MMHG (ref 35–45)
PCO2 BLDA: 44.5 MMHG (ref 35–45)
PCO2 BLDA: 46.3 MMHG (ref 35–45)
PEEP RESPIRATORY: 5 CMH2O
PEEP RESPIRATORY: 5 CMH2O
PH BLDA: 7.42 [PH] (ref 7.35–7.45)
PH BLDA: 7.46 [PH] (ref 7.35–7.45)
PH BLDA: 7.49 [PH] (ref 7.35–7.45)
PH SMN: 7.35 [PH] (ref 7.35–7.45)
PH SMN: 7.36 [PH] (ref 7.35–7.45)
PH SMN: 7.36 [PH] (ref 7.35–7.45)
PH SMN: 7.38 [PH] (ref 7.35–7.45)
PH SMN: 7.39 [PH] (ref 7.35–7.45)
PH SMN: 7.42 [PH] (ref 7.35–7.45)
PHOSPHATE SERPL-MCNC: 3.3 MG/DL (ref 2.3–4.7)
PLATELET # BLD AUTO: 139 X10(3)/MCL (ref 130–400)
PLATELET # BLD AUTO: 171 X10(3)/MCL (ref 130–400)
PMV BLD AUTO: 8.5 FL (ref 7.4–10.4)
PMV BLD AUTO: 9.2 FL (ref 7.4–10.4)
PO2 BLDA: 136 MMHG (ref 80–100)
PO2 BLDA: 145 MMHG (ref 80–100)
PO2 BLDA: 189 MMHG (ref 80–100)
PO2 BLDA: 225 MMHG (ref 80–100)
PO2 BLDA: 277 MMHG (ref 40–60)
PO2 BLDA: 375 MMHG (ref 40–60)
PO2 BLDA: 43 MMHG (ref 40–60)
PO2 BLDA: 51 MMHG (ref 40–60)
PO2 BLDA: 71 MMHG (ref 80–100)
POC BE: -1 MMOL/L
POC BE: -1 MMOL/L
POC BE: -2 MMOL/L
POC BE: 0 MMOL/L
POC BE: 0 MMOL/L
POC BE: 1 MMOL/L
POC IONIZED CALCIUM: 1 MMOL/L (ref 1.06–1.42)
POC IONIZED CALCIUM: 1.04 MMOL/L (ref 1.06–1.42)
POC IONIZED CALCIUM: 1.04 MMOL/L (ref 1.06–1.42)
POC IONIZED CALCIUM: 1.14 MMOL/L (ref 1.06–1.42)
POC IONIZED CALCIUM: 1.15 MMOL/L (ref 1.06–1.42)
POC IONIZED CALCIUM: 1.27 MMOL/L (ref 1.06–1.42)
POC PCO2 TEMP: 33.6 MMHG
POC PCO2 TEMP: 37.9 MMHG
POC PCO2 TEMP: 39.4 MMHG
POC PCO2 TEMP: 40.7 MMHG
POC PCO2 TEMP: 42.6 MMHG
POC PCO2 TEMP: 44.3 MMHG
POC PH TEMP: 7.36
POC PH TEMP: 7.38
POC PH TEMP: 7.39
POC PH TEMP: 7.39
POC PH TEMP: 7.41
POC PH TEMP: 7.45
POC PO2 TEMP: 184 MMHG
POC PO2 TEMP: 216 MMHG
POC PO2 TEMP: 273 MMHG
POC PO2 TEMP: 369 MMHG
POC PO2 TEMP: 37 MMHG
POC PO2 TEMP: 47 MMHG
POC SATURATED O2: 100 % (ref 95–100)
POC SATURATED O2: 77 % (ref 95–100)
POC SATURATED O2: 84 % (ref 95–100)
POC TCO2: 25 MMOL/L (ref 23–27)
POC TCO2: 25 MMOL/L (ref 24–29)
POC TCO2: 26 MMOL/L (ref 23–27)
POC TCO2: 26 MMOL/L (ref 24–29)
POC TCO2: 26 MMOL/L (ref 24–29)
POC TCO2: 28 MMOL/L (ref 24–29)
POC TEMPERATURE: ABNORMAL
POCT GLUCOSE: 100 MG/DL (ref 70–110)
POCT GLUCOSE: 103 MG/DL (ref 70–110)
POCT GLUCOSE: 110 MG/DL (ref 70–110)
POCT GLUCOSE: 124 MG/DL (ref 70–110)
POCT GLUCOSE: 139 MG/DL (ref 70–110)
POCT GLUCOSE: 147 MG/DL (ref 70–110)
POCT GLUCOSE: 149 MG/DL (ref 70–110)
POCT GLUCOSE: 156 MG/DL (ref 70–110)
POCT GLUCOSE: 165 MG/DL (ref 70–110)
POCT GLUCOSE: 77 MG/DL (ref 70–110)
POTASSIUM BLD-SCNC: 3.6 MMOL/L (ref 3.5–5.1)
POTASSIUM BLD-SCNC: 3.8 MMOL/L (ref 3.5–5.1)
POTASSIUM BLD-SCNC: 3.9 MMOL/L (ref 3.5–5.1)
POTASSIUM BLD-SCNC: 4 MMOL/L (ref 3.5–5.1)
POTASSIUM BLD-SCNC: 4.3 MMOL/L (ref 3.5–5.1)
POTASSIUM BLD-SCNC: 4.4 MMOL/L (ref 3.5–5.1)
POTASSIUM BLOOD GAS (OHS): 3.3 MMOL/L (ref 3.5–5)
POTASSIUM BLOOD GAS (OHS): 3.3 MMOL/L (ref 3.5–5)
POTASSIUM BLOOD GAS (OHS): 3.7 MMOL/L (ref 3.5–5)
POTASSIUM SERPL-SCNC: 3.6 MMOL/L (ref 3.5–5.1)
POTASSIUM SERPL-SCNC: 3.8 MMOL/L (ref 3.5–5.1)
POTASSIUM SERPL-SCNC: 4.6 MMOL/L (ref 3.5–5.1)
PROT SERPL-MCNC: 5.3 GM/DL (ref 5.8–7.6)
PROTHROMBIN TIME: 15.6 SECONDS (ref 12.5–14.5)
PROTHROMBIN TIME: 17 SECONDS (ref 12.5–14.5)
PS (OHS): 10 CMH2O
PS (OHS): 10 CMH2O
RBC # BLD AUTO: 3.92 X10(6)/MCL (ref 4.7–6.1)
RBC # BLD AUTO: 4.59 X10(6)/MCL (ref 4.7–6.1)
SAMPLE SITE BLOOD GAS (OHS): ABNORMAL
SAMPLE: ABNORMAL
SAO2 % BLDA: 94.4 %
SAO2 % BLDA: 99 %
SAO2 % BLDA: 99 %
SODIUM BLD-SCNC: 135 MMOL/L (ref 136–145)
SODIUM BLD-SCNC: 136 MMOL/L (ref 136–145)
SODIUM BLOOD GAS (OHS): 130 MMOL/L (ref 137–145)
SODIUM BLOOD GAS (OHS): 134 MMOL/L (ref 137–145)
SODIUM BLOOD GAS (OHS): 135 MMOL/L (ref 137–145)
SODIUM SERPL-SCNC: 139 MMOL/L (ref 136–145)
SODIUM SERPL-SCNC: 140 MMOL/L (ref 136–145)
SPONT+MECH VT ON VENT: 500 ML
UNIT NUMBER: NORMAL
UNIT NUMBER: NORMAL
WBC # BLD AUTO: 14.62 X10(3)/MCL (ref 4.5–11.5)
WBC # BLD AUTO: 16.83 X10(3)/MCL (ref 4.5–11.5)

## 2024-11-04 PROCEDURE — 63600175 PHARM REV CODE 636 W HCPCS: Performed by: THORACIC SURGERY (CARDIOTHORACIC VASCULAR SURGERY)

## 2024-11-04 PROCEDURE — 36000713 HC OR TIME LEV V EA ADD 15 MIN: Performed by: THORACIC SURGERY (CARDIOTHORACIC VASCULAR SURGERY)

## 2024-11-04 PROCEDURE — 27100171 HC OXYGEN HIGH FLOW UP TO 24 HOURS

## 2024-11-04 PROCEDURE — 36000712 HC OR TIME LEV V 1ST 15 MIN: Performed by: THORACIC SURGERY (CARDIOTHORACIC VASCULAR SURGERY)

## 2024-11-04 PROCEDURE — 63600175 PHARM REV CODE 636 W HCPCS: Performed by: PHYSICIAN ASSISTANT

## 2024-11-04 PROCEDURE — 86965 POOLING BLOOD PLATELETS: CPT | Performed by: THORACIC SURGERY (CARDIOTHORACIC VASCULAR SURGERY)

## 2024-11-04 PROCEDURE — 36620 INSERTION CATHETER ARTERY: CPT | Performed by: NURSE ANESTHETIST, CERTIFIED REGISTERED

## 2024-11-04 PROCEDURE — C1768 GRAFT, VASCULAR: HCPCS | Performed by: THORACIC SURGERY (CARDIOTHORACIC VASCULAR SURGERY)

## 2024-11-04 PROCEDURE — 85730 THROMBOPLASTIN TIME PARTIAL: CPT | Performed by: THORACIC SURGERY (CARDIOTHORACIC VASCULAR SURGERY)

## 2024-11-04 PROCEDURE — 84132 ASSAY OF SERUM POTASSIUM: CPT | Performed by: THORACIC SURGERY (CARDIOTHORACIC VASCULAR SURGERY)

## 2024-11-04 PROCEDURE — 25000003 PHARM REV CODE 250: Performed by: THORACIC SURGERY (CARDIOTHORACIC VASCULAR SURGERY)

## 2024-11-04 PROCEDURE — 80053 COMPREHEN METABOLIC PANEL: CPT | Performed by: PHYSICIAN ASSISTANT

## 2024-11-04 PROCEDURE — 76937 US GUIDE VASCULAR ACCESS: CPT | Mod: 26,,, | Performed by: ANESTHESIOLOGY

## 2024-11-04 PROCEDURE — P9037 PLATE PHERES LEUKOREDU IRRAD: HCPCS | Performed by: THORACIC SURGERY (CARDIOTHORACIC VASCULAR SURGERY)

## 2024-11-04 PROCEDURE — 85025 COMPLETE CBC W/AUTO DIFF WBC: CPT | Performed by: THORACIC SURGERY (CARDIOTHORACIC VASCULAR SURGERY)

## 2024-11-04 PROCEDURE — 36620 INSERTION CATHETER ARTERY: CPT | Mod: 59,,, | Performed by: ANESTHESIOLOGY

## 2024-11-04 PROCEDURE — 99024 POSTOP FOLLOW-UP VISIT: CPT | Mod: POP,,, | Performed by: PHYSICIAN ASSISTANT

## 2024-11-04 PROCEDURE — 82962 GLUCOSE BLOOD TEST: CPT | Performed by: THORACIC SURGERY (CARDIOTHORACIC VASCULAR SURGERY)

## 2024-11-04 PROCEDURE — 02RF08Z REPLACEMENT OF AORTIC VALVE WITH ZOOPLASTIC TISSUE, OPEN APPROACH: ICD-10-PCS | Performed by: THORACIC SURGERY (CARDIOTHORACIC VASCULAR SURGERY)

## 2024-11-04 PROCEDURE — 25000003 PHARM REV CODE 250: Performed by: PHYSICIAN ASSISTANT

## 2024-11-04 PROCEDURE — 33405 REPLACEMENT AORTIC VALVE OPN: CPT | Mod: ,,, | Performed by: THORACIC SURGERY (CARDIOTHORACIC VASCULAR SURGERY)

## 2024-11-04 PROCEDURE — 85014 HEMATOCRIT: CPT | Performed by: THORACIC SURGERY (CARDIOTHORACIC VASCULAR SURGERY)

## 2024-11-04 PROCEDURE — 83735 ASSAY OF MAGNESIUM: CPT | Performed by: PHYSICIAN ASSISTANT

## 2024-11-04 PROCEDURE — 5A1221Z PERFORMANCE OF CARDIAC OUTPUT, CONTINUOUS: ICD-10-PCS | Performed by: THORACIC SURGERY (CARDIOTHORACIC VASCULAR SURGERY)

## 2024-11-04 PROCEDURE — 85027 COMPLETE CBC AUTOMATED: CPT | Performed by: PHYSICIAN ASSISTANT

## 2024-11-04 PROCEDURE — 93325 DOPPLER ECHO COLOR FLOW MAPG: CPT | Mod: 26,,, | Performed by: ANESTHESIOLOGY

## 2024-11-04 PROCEDURE — 25000003 PHARM REV CODE 250: Performed by: NURSE ANESTHETIST, CERTIFIED REGISTERED

## 2024-11-04 PROCEDURE — C1713 ANCHOR/SCREW BN/BN,TIS/BN: HCPCS | Performed by: THORACIC SURGERY (CARDIOTHORACIC VASCULAR SURGERY)

## 2024-11-04 PROCEDURE — 80048 BASIC METABOLIC PNL TOTAL CA: CPT | Performed by: THORACIC SURGERY (CARDIOTHORACIC VASCULAR SURGERY)

## 2024-11-04 PROCEDURE — 33405 REPLACEMENT AORTIC VALVE OPN: CPT | Mod: AS,,, | Performed by: PHYSICIAN ASSISTANT

## 2024-11-04 PROCEDURE — 94760 N-INVAS EAR/PLS OXIMETRY 1: CPT | Mod: XB

## 2024-11-04 PROCEDURE — 99900035 HC TECH TIME PER 15 MIN (STAT)

## 2024-11-04 PROCEDURE — 37799 UNLISTED PX VASCULAR SURGERY: CPT

## 2024-11-04 PROCEDURE — 63600175 PHARM REV CODE 636 W HCPCS: Mod: JZ,JG | Performed by: NURSE ANESTHETIST, CERTIFIED REGISTERED

## 2024-11-04 PROCEDURE — 85610 PROTHROMBIN TIME: CPT | Performed by: PHYSICIAN ASSISTANT

## 2024-11-04 PROCEDURE — 36415 COLL VENOUS BLD VENIPUNCTURE: CPT | Performed by: PHYSICIAN ASSISTANT

## 2024-11-04 PROCEDURE — D9220A PRA ANESTHESIA: Mod: ANES,,, | Performed by: ANESTHESIOLOGY

## 2024-11-04 PROCEDURE — 37000009 HC ANESTHESIA EA ADD 15 MINS: Performed by: THORACIC SURGERY (CARDIOTHORACIC VASCULAR SURGERY)

## 2024-11-04 PROCEDURE — 88311 DECALCIFY TISSUE: CPT

## 2024-11-04 PROCEDURE — 27201423 OPTIME MED/SURG SUP & DEVICES STERILE SUPPLY: Performed by: THORACIC SURGERY (CARDIOTHORACIC VASCULAR SURGERY)

## 2024-11-04 PROCEDURE — S5010 5% DEXTROSE AND 0.45% SALINE: HCPCS | Performed by: PHYSICIAN ASSISTANT

## 2024-11-04 PROCEDURE — 02L70CK OCCLUSION OF LEFT ATRIAL APPENDAGE WITH EXTRALUMINAL DEVICE, OPEN APPROACH: ICD-10-PCS | Performed by: THORACIC SURGERY (CARDIOTHORACIC VASCULAR SURGERY)

## 2024-11-04 PROCEDURE — 94002 VENT MGMT INPAT INIT DAY: CPT

## 2024-11-04 PROCEDURE — 82803 BLOOD GASES ANY COMBINATION: CPT

## 2024-11-04 PROCEDURE — 93320 DOPPLER ECHO COMPLETE: CPT | Mod: 26,,, | Performed by: ANESTHESIOLOGY

## 2024-11-04 PROCEDURE — C1729 CATH, DRAINAGE: HCPCS | Performed by: THORACIC SURGERY (CARDIOTHORACIC VASCULAR SURGERY)

## 2024-11-04 PROCEDURE — 20000000 HC ICU ROOM

## 2024-11-04 PROCEDURE — 99900017 HC EXTUBATION W/PARAMETERS (STAT)

## 2024-11-04 PROCEDURE — 37000008 HC ANESTHESIA 1ST 15 MINUTES: Performed by: THORACIC SURGERY (CARDIOTHORACIC VASCULAR SURGERY)

## 2024-11-04 PROCEDURE — 27200966 HC CLOSED SUCTION SYSTEM

## 2024-11-04 PROCEDURE — 84100 ASSAY OF PHOSPHORUS: CPT | Performed by: PHYSICIAN ASSISTANT

## 2024-11-04 PROCEDURE — 88305 TISSUE EXAM BY PATHOLOGIST: CPT | Performed by: THORACIC SURGERY (CARDIOTHORACIC VASCULAR SURGERY)

## 2024-11-04 PROCEDURE — 85610 PROTHROMBIN TIME: CPT | Performed by: THORACIC SURGERY (CARDIOTHORACIC VASCULAR SURGERY)

## 2024-11-04 PROCEDURE — 27800903 OPTIME MED/SURG SUP & DEVICES OTHER IMPLANTS: Performed by: THORACIC SURGERY (CARDIOTHORACIC VASCULAR SURGERY)

## 2024-11-04 PROCEDURE — 30233M1 TRANSFUSION OF NONAUTOLOGOUS PLASMA CRYOPRECIPITATE INTO PERIPHERAL VEIN, PERCUTANEOUS APPROACH: ICD-10-PCS | Performed by: ANESTHESIOLOGY

## 2024-11-04 PROCEDURE — P9045 ALBUMIN (HUMAN), 5%, 250 ML: HCPCS | Mod: JZ,JG | Performed by: PHYSICIAN ASSISTANT

## 2024-11-04 PROCEDURE — 93312 ECHO TRANSESOPHAGEAL: CPT | Mod: 26,59,, | Performed by: ANESTHESIOLOGY

## 2024-11-04 PROCEDURE — 30233R1 TRANSFUSION OF NONAUTOLOGOUS PLATELETS INTO PERIPHERAL VEIN, PERCUTANEOUS APPROACH: ICD-10-PCS | Performed by: ANESTHESIOLOGY

## 2024-11-04 PROCEDURE — 85730 THROMBOPLASTIN TIME PARTIAL: CPT | Performed by: PHYSICIAN ASSISTANT

## 2024-11-04 PROCEDURE — 86923 COMPATIBILITY TEST ELECTRIC: CPT | Mod: 91 | Performed by: THORACIC SURGERY (CARDIOTHORACIC VASCULAR SURGERY)

## 2024-11-04 PROCEDURE — P9012 CRYOPRECIPITATE EACH UNIT: HCPCS | Performed by: THORACIC SURGERY (CARDIOTHORACIC VASCULAR SURGERY)

## 2024-11-04 PROCEDURE — C1751 CATH, INF, PER/CENT/MIDLINE: HCPCS | Performed by: THORACIC SURGERY (CARDIOTHORACIC VASCULAR SURGERY)

## 2024-11-04 PROCEDURE — D9220A PRA ANESTHESIA: Mod: CRNA,,, | Performed by: NURSE ANESTHETIST, CERTIFIED REGISTERED

## 2024-11-04 DEVICE — PLATE LOW PROFILE X-PLATE: Type: IMPLANTABLE DEVICE | Site: STERNUM | Status: FUNCTIONAL

## 2024-11-04 DEVICE — PLATE L STERNAL LOW PROFILE: Type: IMPLANTABLE DEVICE | Site: STERNUM | Status: FUNCTIONAL

## 2024-11-04 DEVICE — IMPLANTABLE DEVICE: Type: IMPLANTABLE DEVICE | Site: HEART | Status: FUNCTIONAL

## 2024-11-04 DEVICE — SCREW SD LOCKING 2.3X14MM: Type: IMPLANTABLE DEVICE | Site: STERNUM | Status: FUNCTIONAL

## 2024-11-04 DEVICE — SCREW SD LOCKING 2.3X18MM: Type: IMPLANTABLE DEVICE | Site: STERNUM | Status: FUNCTIONAL

## 2024-11-04 DEVICE — CARPENTIER-EDWARDS PERIMOUNT MAGNA EASE PERICARDIAL AORTIC BIOPROSTHESIS
Type: IMPLANTABLE DEVICE | Site: HEART | Status: FUNCTIONAL
Brand: CARPENTIER-EDWARDS PERIMOUNT MAGNA EASE PERICARDIAL BIOPROSTHESIS - AORTIC

## 2024-11-04 DEVICE — SCREW SD LOCKING 2.3X16MM: Type: IMPLANTABLE DEVICE | Site: STERNUM | Status: FUNCTIONAL

## 2024-11-04 DEVICE — PLATE MANUBRIUM LOW PROFILE: Type: IMPLANTABLE DEVICE | Site: STERNUM | Status: FUNCTIONAL

## 2024-11-04 RX ORDER — HYDROCODONE BITARTRATE AND ACETAMINOPHEN 500; 5 MG/1; MG/1
TABLET ORAL
Status: DISCONTINUED | OUTPATIENT
Start: 2024-11-04 | End: 2024-11-04 | Stop reason: HOSPADM

## 2024-11-04 RX ORDER — PROTAMINE SULFATE 10 MG/ML
INJECTION, SOLUTION INTRAVENOUS
Status: DISCONTINUED | OUTPATIENT
Start: 2024-11-04 | End: 2024-11-04

## 2024-11-04 RX ORDER — SUCRALFATE 1 G/1
1 TABLET ORAL
Status: DISCONTINUED | OUTPATIENT
Start: 2024-11-05 | End: 2024-11-08 | Stop reason: HOSPADM

## 2024-11-04 RX ORDER — CALCIUM CHLORIDE 100 MG/ML
INJECTION, SOLUTION INTRAVENOUS
Status: DISCONTINUED | OUTPATIENT
Start: 2024-11-04 | End: 2024-11-04

## 2024-11-04 RX ORDER — PROPOFOL 10 MG/ML
VIAL (ML) INTRAVENOUS
Status: DISCONTINUED | OUTPATIENT
Start: 2024-11-04 | End: 2024-11-04

## 2024-11-04 RX ORDER — ASPIRIN 81 MG/1
81 TABLET ORAL DAILY
Status: DISCONTINUED | OUTPATIENT
Start: 2024-11-05 | End: 2024-11-08 | Stop reason: HOSPADM

## 2024-11-04 RX ORDER — DEXTROSE MONOHYDRATE AND SODIUM CHLORIDE 5; .45 G/100ML; G/100ML
INJECTION, SOLUTION INTRAVENOUS CONTINUOUS
Status: DISCONTINUED | OUTPATIENT
Start: 2024-11-04 | End: 2024-11-08 | Stop reason: HOSPADM

## 2024-11-04 RX ORDER — TRANEXAMIC ACID 100 MG/ML
INJECTION, SOLUTION INTRAVENOUS
Status: DISCONTINUED | OUTPATIENT
Start: 2024-11-04 | End: 2024-11-04

## 2024-11-04 RX ORDER — CEFAZOLIN SODIUM 2 G/50ML
2 SOLUTION INTRAVENOUS
Status: COMPLETED | OUTPATIENT
Start: 2024-11-04 | End: 2024-11-05

## 2024-11-04 RX ORDER — DEXMEDETOMIDINE HYDROCHLORIDE 4 UG/ML
0-1.4 INJECTION, SOLUTION INTRAVENOUS CONTINUOUS
Status: DISCONTINUED | OUTPATIENT
Start: 2024-11-04 | End: 2024-11-06 | Stop reason: HOSPADM

## 2024-11-04 RX ORDER — CEFAZOLIN SODIUM 2 G/50ML
2 SOLUTION INTRAVENOUS
Status: DISCONTINUED | OUTPATIENT
Start: 2024-11-04 | End: 2024-11-04

## 2024-11-04 RX ORDER — DOCUSATE SODIUM 100 MG/1
100 CAPSULE, LIQUID FILLED ORAL 2 TIMES DAILY
Status: DISCONTINUED | OUTPATIENT
Start: 2024-11-05 | End: 2024-11-08 | Stop reason: HOSPADM

## 2024-11-04 RX ORDER — MUPIROCIN 20 MG/G
OINTMENT TOPICAL
Status: DISCONTINUED | OUTPATIENT
Start: 2024-11-04 | End: 2024-11-04 | Stop reason: HOSPADM

## 2024-11-04 RX ORDER — ONDANSETRON HYDROCHLORIDE 2 MG/ML
4 INJECTION, SOLUTION INTRAVENOUS EVERY 4 HOURS PRN
Status: DISCONTINUED | OUTPATIENT
Start: 2024-11-04 | End: 2024-11-08 | Stop reason: HOSPADM

## 2024-11-04 RX ORDER — SODIUM CHLORIDE, SODIUM GLUCONATE, SODIUM ACETATE, POTASSIUM CHLORIDE AND MAGNESIUM CHLORIDE 30; 37; 368; 526; 502 MG/100ML; MG/100ML; MG/100ML; MG/100ML; MG/100ML
INJECTION, SOLUTION INTRAVENOUS CONTINUOUS
Status: DISCONTINUED | OUTPATIENT
Start: 2024-11-04 | End: 2024-11-08

## 2024-11-04 RX ORDER — METOCLOPRAMIDE HYDROCHLORIDE 5 MG/ML
5 INJECTION INTRAMUSCULAR; INTRAVENOUS EVERY 6 HOURS PRN
Status: DISCONTINUED | OUTPATIENT
Start: 2024-11-04 | End: 2024-11-08 | Stop reason: HOSPADM

## 2024-11-04 RX ORDER — POTASSIUM CHLORIDE 14.9 MG/ML
20 INJECTION INTRAVENOUS
Status: DISCONTINUED | OUTPATIENT
Start: 2024-11-04 | End: 2024-11-08 | Stop reason: HOSPADM

## 2024-11-04 RX ORDER — MIDAZOLAM HYDROCHLORIDE 1 MG/ML
INJECTION INTRAMUSCULAR; INTRAVENOUS
Status: DISCONTINUED | OUTPATIENT
Start: 2024-11-04 | End: 2024-11-04

## 2024-11-04 RX ORDER — GABAPENTIN 300 MG/1
300 CAPSULE ORAL ONCE
OUTPATIENT
Start: 2024-11-04

## 2024-11-04 RX ORDER — LACTULOSE 10 G/15ML
20 SOLUTION ORAL EVERY 6 HOURS PRN
Status: DISCONTINUED | OUTPATIENT
Start: 2024-11-04 | End: 2024-11-08 | Stop reason: HOSPADM

## 2024-11-04 RX ORDER — CEFUROXIME SODIUM 1.5 G/16ML
1.5 INJECTION, POWDER, FOR SOLUTION INTRAVENOUS
Status: COMPLETED | OUTPATIENT
Start: 2024-11-04 | End: 2024-11-04

## 2024-11-04 RX ORDER — HEPARIN SODIUM 1000 [USP'U]/ML
INJECTION, SOLUTION INTRAVENOUS; SUBCUTANEOUS
Status: DISCONTINUED | OUTPATIENT
Start: 2024-11-04 | End: 2024-11-04

## 2024-11-04 RX ORDER — MORPHINE SULFATE 4 MG/ML
4 INJECTION, SOLUTION INTRAMUSCULAR; INTRAVENOUS EVERY 4 HOURS PRN
Status: DISCONTINUED | OUTPATIENT
Start: 2024-11-04 | End: 2024-11-08 | Stop reason: HOSPADM

## 2024-11-04 RX ORDER — EPHEDRINE SULFATE 50 MG/ML
INJECTION, SOLUTION INTRAVENOUS
Status: DISCONTINUED | OUTPATIENT
Start: 2024-11-04 | End: 2024-11-04

## 2024-11-04 RX ORDER — ACETAMINOPHEN 10 MG/ML
1000 INJECTION, SOLUTION INTRAVENOUS EVERY 8 HOURS
Status: COMPLETED | OUTPATIENT
Start: 2024-11-04 | End: 2024-11-05

## 2024-11-04 RX ORDER — ALBUMIN HUMAN 50 G/1000ML
12.5 SOLUTION INTRAVENOUS
Status: DISCONTINUED | OUTPATIENT
Start: 2024-11-04 | End: 2024-11-08 | Stop reason: HOSPADM

## 2024-11-04 RX ORDER — CALCIUM GLUCONATE 20 MG/ML
3 INJECTION, SOLUTION INTRAVENOUS
Status: DISCONTINUED | OUTPATIENT
Start: 2024-11-04 | End: 2024-11-08 | Stop reason: HOSPADM

## 2024-11-04 RX ORDER — PHENYLEPHRINE HYDROCHLORIDE 10 MG/ML
INJECTION INTRAVENOUS
Status: DISCONTINUED | OUTPATIENT
Start: 2024-11-04 | End: 2024-11-04

## 2024-11-04 RX ORDER — POTASSIUM CHLORIDE 14.9 MG/ML
60 INJECTION INTRAVENOUS
Status: DISCONTINUED | OUTPATIENT
Start: 2024-11-04 | End: 2024-11-08 | Stop reason: HOSPADM

## 2024-11-04 RX ORDER — POTASSIUM CHLORIDE 29.8 MG/ML
40 INJECTION INTRAVENOUS
Status: DISCONTINUED | OUTPATIENT
Start: 2024-11-04 | End: 2024-11-08 | Stop reason: HOSPADM

## 2024-11-04 RX ORDER — VASOPRESSIN 20 [USP'U]/ML
INJECTION, SOLUTION INTRAMUSCULAR; SUBCUTANEOUS
Status: DISCONTINUED | OUTPATIENT
Start: 2024-11-04 | End: 2024-11-04

## 2024-11-04 RX ORDER — CALCIUM GLUCONATE 20 MG/ML
1 INJECTION, SOLUTION INTRAVENOUS
Status: DISCONTINUED | OUTPATIENT
Start: 2024-11-04 | End: 2024-11-08 | Stop reason: HOSPADM

## 2024-11-04 RX ORDER — FENTANYL CITRATE 50 UG/ML
INJECTION, SOLUTION INTRAMUSCULAR; INTRAVENOUS
Status: DISCONTINUED | OUTPATIENT
Start: 2024-11-04 | End: 2024-11-04

## 2024-11-04 RX ORDER — LOPERAMIDE HYDROCHLORIDE 2 MG/1
2 CAPSULE ORAL CONTINUOUS PRN
Status: DISCONTINUED | OUTPATIENT
Start: 2024-11-04 | End: 2024-11-08 | Stop reason: HOSPADM

## 2024-11-04 RX ORDER — ACETAMINOPHEN 650 MG/20.3ML
650 LIQUID ORAL EVERY 6 HOURS PRN
Status: DISCONTINUED | OUTPATIENT
Start: 2024-11-04 | End: 2024-11-08 | Stop reason: HOSPADM

## 2024-11-04 RX ORDER — ETOMIDATE 2 MG/ML
INJECTION INTRAVENOUS
Status: DISCONTINUED | OUTPATIENT
Start: 2024-11-04 | End: 2024-11-04

## 2024-11-04 RX ORDER — MAGNESIUM SULFATE HEPTAHYDRATE 40 MG/ML
2 INJECTION, SOLUTION INTRAVENOUS
Status: DISCONTINUED | OUTPATIENT
Start: 2024-11-04 | End: 2024-11-08 | Stop reason: HOSPADM

## 2024-11-04 RX ORDER — ENOXAPARIN SODIUM 100 MG/ML
40 INJECTION SUBCUTANEOUS EVERY 24 HOURS
Status: DISCONTINUED | OUTPATIENT
Start: 2024-11-05 | End: 2024-11-08 | Stop reason: HOSPADM

## 2024-11-04 RX ORDER — ROCURONIUM BROMIDE 10 MG/ML
INJECTION, SOLUTION INTRAVENOUS
Status: DISCONTINUED | OUTPATIENT
Start: 2024-11-04 | End: 2024-11-04

## 2024-11-04 RX ORDER — MUPIROCIN 20 MG/G
OINTMENT TOPICAL 2 TIMES DAILY
Status: DISPENSED | OUTPATIENT
Start: 2024-11-04 | End: 2024-11-06

## 2024-11-04 RX ORDER — DIPHENHYDRAMINE HYDROCHLORIDE 50 MG/ML
INJECTION INTRAMUSCULAR; INTRAVENOUS
Status: DISCONTINUED | OUTPATIENT
Start: 2024-11-04 | End: 2024-11-04

## 2024-11-04 RX ORDER — LIDOCAINE HYDROCHLORIDE 10 MG/ML
1 INJECTION, SOLUTION EPIDURAL; INFILTRATION; INTRACAUDAL; PERINEURAL ONCE
OUTPATIENT
Start: 2024-11-04 | End: 2024-11-04

## 2024-11-04 RX ORDER — MUPIROCIN 20 MG/G
1 OINTMENT TOPICAL DAILY
Status: ON HOLD | COMMUNITY
Start: 2024-11-03 | End: 2024-11-07 | Stop reason: HOSPADM

## 2024-11-04 RX ORDER — CALCIUM CHLORIDE INJECTION 100 MG/ML
INJECTION, SOLUTION INTRAVENOUS
Status: DISCONTINUED | OUTPATIENT
Start: 2024-11-04 | End: 2024-11-04 | Stop reason: HOSPADM

## 2024-11-04 RX ORDER — CALCIUM GLUCONATE 20 MG/ML
2 INJECTION, SOLUTION INTRAVENOUS
Status: DISCONTINUED | OUTPATIENT
Start: 2024-11-04 | End: 2024-11-08 | Stop reason: HOSPADM

## 2024-11-04 RX ORDER — OXYCODONE HYDROCHLORIDE 10 MG/1
10 TABLET ORAL EVERY 4 HOURS PRN
Status: DISCONTINUED | OUTPATIENT
Start: 2024-11-04 | End: 2024-11-08 | Stop reason: HOSPADM

## 2024-11-04 RX ORDER — FAMOTIDINE 10 MG/ML
20 INJECTION INTRAVENOUS ONCE
OUTPATIENT
Start: 2024-11-04

## 2024-11-04 RX ORDER — FAMOTIDINE 10 MG/ML
20 INJECTION INTRAVENOUS DAILY
Status: DISCONTINUED | OUTPATIENT
Start: 2024-11-04 | End: 2024-11-08 | Stop reason: HOSPADM

## 2024-11-04 RX ORDER — HYDROCODONE BITARTRATE AND ACETAMINOPHEN 5; 325 MG/1; MG/1
1 TABLET ORAL EVERY 4 HOURS PRN
Status: DISCONTINUED | OUTPATIENT
Start: 2024-11-04 | End: 2024-11-08 | Stop reason: HOSPADM

## 2024-11-04 RX ORDER — MAGNESIUM SULFATE HEPTAHYDRATE 40 MG/ML
4 INJECTION, SOLUTION INTRAVENOUS
Status: DISCONTINUED | OUTPATIENT
Start: 2024-11-04 | End: 2024-11-08 | Stop reason: HOSPADM

## 2024-11-04 RX ORDER — ASPIRIN 81 MG/1
81 TABLET ORAL DAILY
COMMUNITY

## 2024-11-04 RX ORDER — FOLIC ACID 1 MG/1
1 TABLET ORAL DAILY
Status: DISCONTINUED | OUTPATIENT
Start: 2024-11-05 | End: 2024-11-08 | Stop reason: HOSPADM

## 2024-11-04 RX ORDER — METOPROLOL TARTRATE 25 MG/1
12.5 TABLET ORAL 2 TIMES DAILY
Status: DISCONTINUED | OUTPATIENT
Start: 2024-11-05 | End: 2024-11-08 | Stop reason: HOSPADM

## 2024-11-04 RX ADMIN — DEXTROSE AND SODIUM CHLORIDE: 5; 450 INJECTION, SOLUTION INTRAVENOUS at 11:11

## 2024-11-04 RX ADMIN — ROCURONIUM BROMIDE 30 MG: 10 SOLUTION INTRAVENOUS at 08:11

## 2024-11-04 RX ADMIN — CALCIUM CHLORIDE 250 MG: 100 INJECTION, SOLUTION INTRAVENOUS at 10:11

## 2024-11-04 RX ADMIN — PHENYLEPHRINE HYDROCHLORIDE 100 MCG: 10 INJECTION INTRAVENOUS at 07:11

## 2024-11-04 RX ADMIN — OXYCODONE HYDROCHLORIDE 10 MG: 10 TABLET ORAL at 10:11

## 2024-11-04 RX ADMIN — CEFUROXIME 1.5 G: 1.5 INJECTION, POWDER, FOR SOLUTION INTRAVENOUS at 07:11

## 2024-11-04 RX ADMIN — TRANEXAMIC ACID 1000 MG: 100 INJECTION, SOLUTION INTRAVENOUS at 07:11

## 2024-11-04 RX ADMIN — EPHEDRINE SULFATE 10 MG: 50 INJECTION INTRAVENOUS at 07:11

## 2024-11-04 RX ADMIN — MIDAZOLAM HYDROCHLORIDE 3 MG: 1 INJECTION, SOLUTION INTRAMUSCULAR; INTRAVENOUS at 09:11

## 2024-11-04 RX ADMIN — MUPIROCIN: 20 OINTMENT TOPICAL at 08:11

## 2024-11-04 RX ADMIN — PROTAMINE SULFATE 50 MG: 10 INJECTION, SOLUTION INTRAVENOUS at 10:11

## 2024-11-04 RX ADMIN — EPHEDRINE SULFATE 10 MG: 50 INJECTION INTRAVENOUS at 10:11

## 2024-11-04 RX ADMIN — TRANEXAMIC ACID 1000 MG: 100 INJECTION, SOLUTION INTRAVENOUS at 09:11

## 2024-11-04 RX ADMIN — PROPOFOL 50 MG: 10 INJECTION, EMULSION INTRAVENOUS at 08:11

## 2024-11-04 RX ADMIN — ETOMIDATE 20 MG: 2 INJECTION INTRAVENOUS at 06:11

## 2024-11-04 RX ADMIN — MORPHINE SULFATE 4 MG: 4 INJECTION INTRAVENOUS at 11:11

## 2024-11-04 RX ADMIN — PROPOFOL 50 MG: 10 INJECTION, EMULSION INTRAVENOUS at 06:11

## 2024-11-04 RX ADMIN — OXYCODONE HYDROCHLORIDE 10 MG: 10 TABLET ORAL at 03:11

## 2024-11-04 RX ADMIN — DEXMEDETOMIDINE 0.5 MCG/KG/HR: 200 INJECTION, SOLUTION INTRAVENOUS at 09:11

## 2024-11-04 RX ADMIN — INSULIN HUMAN 1 UNITS: 100 INJECTION, SOLUTION PARENTERAL at 10:11

## 2024-11-04 RX ADMIN — ROCURONIUM BROMIDE 70 MG: 10 SOLUTION INTRAVENOUS at 06:11

## 2024-11-04 RX ADMIN — CALCIUM CHLORIDE 250 MG: 100 INJECTION, SOLUTION INTRAVENOUS at 09:11

## 2024-11-04 RX ADMIN — VASOPRESSIN 1 UNITS: 20 INJECTION INTRAVENOUS at 10:11

## 2024-11-04 RX ADMIN — ROCURONIUM BROMIDE 20 MG: 10 SOLUTION INTRAVENOUS at 10:11

## 2024-11-04 RX ADMIN — MIDAZOLAM HYDROCHLORIDE 2 MG: 1 INJECTION, SOLUTION INTRAMUSCULAR; INTRAVENOUS at 06:11

## 2024-11-04 RX ADMIN — INSULIN HUMAN 1 UNITS: 100 INJECTION, SOLUTION PARENTERAL at 09:11

## 2024-11-04 RX ADMIN — FENTANYL CITRATE 250 MCG: 50 INJECTION, SOLUTION INTRAMUSCULAR; INTRAVENOUS at 06:11

## 2024-11-04 RX ADMIN — DIPHENHYDRAMINE HYDROCHLORIDE 50 MG: 50 INJECTION INTRAMUSCULAR; INTRAVENOUS at 09:11

## 2024-11-04 RX ADMIN — CEFAZOLIN SODIUM 2 G: 2 SOLUTION INTRAVENOUS at 08:11

## 2024-11-04 RX ADMIN — SODIUM CHLORIDE: 9 INJECTION, SOLUTION INTRAVENOUS at 06:11

## 2024-11-04 RX ADMIN — FENTANYL CITRATE 150 MCG: 50 INJECTION, SOLUTION INTRAMUSCULAR; INTRAVENOUS at 08:11

## 2024-11-04 RX ADMIN — FENTANYL CITRATE 100 MCG: 50 INJECTION, SOLUTION INTRAMUSCULAR; INTRAVENOUS at 08:11

## 2024-11-04 RX ADMIN — SUGAMMADEX 200 MG: 100 INJECTION, SOLUTION INTRAVENOUS at 11:11

## 2024-11-04 RX ADMIN — POTASSIUM CHLORIDE 20 MEQ: 14.9 INJECTION INTRAVENOUS at 03:11

## 2024-11-04 RX ADMIN — SODIUM CHLORIDE 2 UNITS/HR: 9 INJECTION, SOLUTION INTRAVENOUS at 09:11

## 2024-11-04 RX ADMIN — HEPARIN SODIUM 30000 UNITS: 1000 INJECTION, SOLUTION INTRAVENOUS; SUBCUTANEOUS at 08:11

## 2024-11-04 RX ADMIN — ACETAMINOPHEN 1000 MG: 10 INJECTION, SOLUTION INTRAVENOUS at 01:11

## 2024-11-04 RX ADMIN — POTASSIUM CHLORIDE 20 MEQ: 14.9 INJECTION INTRAVENOUS at 11:11

## 2024-11-04 RX ADMIN — PROTAMINE SULFATE 400 MG: 10 INJECTION, SOLUTION INTRAVENOUS at 09:11

## 2024-11-04 RX ADMIN — EPHEDRINE SULFATE 20 MG: 50 INJECTION INTRAVENOUS at 07:11

## 2024-11-04 RX ADMIN — ROCURONIUM BROMIDE 50 MG: 10 SOLUTION INTRAVENOUS at 09:11

## 2024-11-04 RX ADMIN — FAMOTIDINE 20 MG: 10 INJECTION, SOLUTION INTRAVENOUS at 06:11

## 2024-11-04 RX ADMIN — ALBUMIN (HUMAN) 12.5 G: 2.5 SOLUTION INTRAVENOUS at 01:11

## 2024-11-04 RX ADMIN — ACETAMINOPHEN 1000 MG: 10 INJECTION, SOLUTION INTRAVENOUS at 09:11

## 2024-11-04 NOTE — ANESTHESIA PROCEDURE NOTES
CHAYO    Diagnosis: aortic stenosis  Patient location during procedure: OR  Timeout: 11/4/2024 7:45 AM  Procedure end time: 11/4/2024 7:55 AM  Exam type: Baseline    Staffing  Performed: anesthesiologist     Anesthesiologist: Timothy Adam DO        Anesthesiologist Present  Yes      Setup & Induction  Patient preparation: bite block inserted  Probe Insertion: easy  Exam: completeDoppler Echo: 2D, continuous wave Doppler and color flow mapping.  Exam     Left Heart  Left Atruim: normal and normal (men 3.0-4.0; women 2.7-3.8)  Left appendage velocity:60    Left Ventricle: cm, normal (men 4.2-5.9; women 3.8-5.2)  LV Wall Thickness (posterior wall):cm, severly abnormal (men >1.6 cm; women > 1.5 cm)    LVAD  Estimated Ejection Fraction: > 55% normal  Regional Wall Abnormalities: no RWMA            Right Heart  Right Atria:  normal    Intra Atrial Septum  PFO: no shunt by color flow doppler  no IAS aneurysm  no lipomatous hypertrophy  no Atrial Septal Defect (Asd)    Right Ventricle    Aortic Valve:  Stenosis: severe.  Morphology: bicuspid aortic valve    Annulus Diameter: 2.1 cm  MAYCOL by planimetry: 0.64 cm2  Regurgitation: no aortic valve regurgitation      Mitral Valve:   Morphology:normal  Prolapse: none  Flail: no flail  Jet Description: mild            Great Vessels  Ascending Aorta Atherosclerosis: 2=mild dz (<3mm)  Aortic Arch Atherosclerosis: 2=mild dz (<3mm)      Effusions none    SummaryFindings discussed with surgeon.    Other Findings

## 2024-11-04 NOTE — TRANSFER OF CARE
"Anesthesia Transfer of Care Note    Patient: Lito Durham    Procedure(s) Performed: Procedure(s) (LRB):  Replacement-valve-aortic (N/A)  PLATING, STERNAL (N/A)  Left atrial appendage closure device (N/A)    Patient location: ICU    Anesthesia Type: general    Transport from OR: Transported from OR intubated on 100% O2 by AMBU with adequate controlled ventilation    Post pain: adequate analgesia    Post assessment: no apparent anesthetic complications    Post vital signs: stable    Level of consciousness: sedated    Nausea/Vomiting: no nausea/vomiting    Complications: none    Transfer of care protocol was followed    Last vitals: Visit Vitals  /77   Pulse 75   Temp 36.8 °C (98.2 °F) (Oral)   Resp (!) 21   Ht 5' 6" (1.676 m)   Wt 96.3 kg (212 lb 4.9 oz)   SpO2 (!) 94%   BMI 34.27 kg/m²     "

## 2024-11-04 NOTE — PROGRESS NOTES
CT SURGERY PROGRESS NOTE  Lito Durham  69 y.o.  1955    Patients Procedure: Procedure(s) (LRB):  Replacement-valve-aortic (N/A)  PLATING, STERNAL (N/A)  Left atrial appendage closure device (N/A)    Subjective  Interval History: AVR this am     ROS    Medication List  Infusions   dexmedeTOMIDine (Precedex) infusion (titrating)  0-1.4 mcg/kg/hr Intravenous Continuous        D5 and 0.45% NaCl   Intravenous Continuous 100 mL/hr at 11/04/24 1114 New Bag at 11/04/24 1114    EPINEPHrine  0-2 mcg/kg/min Intravenous Continuous        insulin regular 1 units/mL infusion orderable (CTS POST-OP)  0-52 Units/hr Intravenous Continuous        loperamide  2 mg Oral Continuous PRN         Scheduled   [START ON 11/5/2024] aspirin  81 mg Oral Daily    ceFAZolin (Ancef) IV (PEDS and ADULTS)  2 g Intravenous Q12H    [START ON 11/5/2024] docusate sodium  100 mg Oral BID    [START ON 11/5/2024] enoxparin  40 mg Subcutaneous Daily    famotidine (PF)  20 mg Intravenous Daily    [START ON 11/5/2024] folic acid  1 mg Oral Daily    [START ON 11/5/2024] metoprolol tartrate  12.5 mg Oral BID    mupirocin   Nasal BID    [START ON 11/5/2024] sucralfate  1 g Oral QID (AC & HS)       Objective:  Recent Vitals:  Temp:  [98.2 °F (36.8 °C)-98.3 °F (36.8 °C)] 98.3 °F (36.8 °C)  Pulse:  [75-83] 75  Resp:  [18-21] 20  SpO2:  [94 %-99 %] 94 %  BP: (115-137)/(77-87) 124/77  Arterial Line BP: (118)/(89) 118/89    Physical Exam     I/O last 24 hrs:  Intake/Output - Last 3 Shifts         11/02 0700  11/03 0659 11/03 0700  11/04 0659 11/04 0700  11/05 0659    Blood   1250    IV Piggyback   550    Total Intake(mL/kg)   1800 (18.7)    Urine (mL/kg/hr)   1450 (3.1)    Total Output   1450    Net   +350                   Labs  BMP:   Recent Labs   Lab 11/04/24  1002      K 3.6   *   CO2 22*   BUN 15.0   CREATININE 0.66*   CALCIUM 10.2*     CBC:   Recent Labs   Lab 11/04/24  1002 11/04/24  1011   WBC 14.62*  --    RBC 4.59*  --    HGB 13.5*   --    HCT 41.5* 34*     --    MCV 90.4  --    MCH 29.4  --    MCHC 32.5*  --      CMP:   Recent Labs   Lab 11/04/24  1002   CALCIUM 10.2*      K 3.6   CO2 22*   *   BUN 15.0   CREATININE 0.66*         Imaging:   CXR: No results found in the last 24 hours.        ASSESSMENT/PLAN:    AVR this am VSS, hypotesnsive - no drips  Intubated sedated  SR 80s   Satis UO   Scant CT   CO 4.3 CI 2.1     Plan   Inc fluids now  Recheck HCT - may need blood transfusion     Case and plan of care discussed with MD Eugene Eldridge, PANayaC

## 2024-11-04 NOTE — ANESTHESIA PREPROCEDURE EVALUATION
11/03/2024  Lito Durham is a 69 y.o., male.      Pre-op Assessment    I have reviewed the Patient Summary Reports.     I have reviewed the Nursing Notes. I have reviewed the NPO Status.   I have reviewed the Medications.     Review of Systems  Anesthesia Hx:    PONV              Social:  Non-Smoker       Hematology/Oncology:                   Hematology Comments: H/H 14.8/44.9  plt 266                      Cardiovascular:     Hypertension Valvular problems/Murmurs (severe AS), AS  Denies MI.        Denies Angina.  Denies CHF.    hyperlipidemia    Echo 8/1/24    EF 55-60%    AV stenosis, calcific    MAYCOL 0.7 cm2    Mean pressure         gradient 48 mmHg    LVH    Mild AI    Cath     EF 65%    1+ AI    Referred for TAVR evaluation                       Hypertension         Pulmonary:    Denies COPD.  Denies Asthma.    Sleep Apnea, CPAP     Obstructive Sleep Apnea (KAYLA).           Renal/:   Denies Chronic Renal Disease. no renal calculi  Cr 0.73             Hepatic/GI:      Denies GERD. Denies Liver Disease.  Denies Hepatitis.              Neurological:    Denies CVA.    Denies Seizures.                                Endocrine:  Denies Diabetes. Denies Hypothyroidism.  Denies Hyperthyroidism. K+ 4.4        Obesity / BMI > 30      Physical Exam  General: Well nourished, Cooperative, Alert and Oriented    Airway:  Mallampati: I   Mouth Opening: Normal  TM Distance: Normal  Tongue: Normal  Neck ROM: Normal ROM    Dental:  Intact        Anesthesia Plan  Type of Anesthesia, risks & benefits discussed:    Anesthesia Type: Gen ETT  Intra-op Monitoring Plan: Standard ASA Monitors, Art Line, Central Line and CHAYO  Post Op Pain Control Plan: multimodal analgesia  Induction:  IV  Airway Plan: Video  Informed Consent: Informed consent signed with the Patient and all parties understand the risks and agree with  anesthesia plan.  All questions answered. Patient consented to blood products? Yes  ASA Score: 3  Day of Surgery Review of History & Physical: H&P Update referred to the surgeon/provider.    Ready For Surgery From Anesthesia Perspective.     .

## 2024-11-04 NOTE — ANESTHESIA PROCEDURE NOTES
Intubation    Date/Time: 11/4/2024 6:57 AM    Performed by: Shaquille Neil CRNA  Authorized by: Timothy Adam DO    Intubation:     Induction:  Intravenous    Intubated:  Postinduction    Mask Ventilation:  Easy mask    Attempts:  1    Attempted By:  CRNA    Method of Intubation:  Direct    Blade:  Arriaga 2    Laryngeal View Grade: Grade IIA - cords partially seen      Difficult Airway Encountered?: No      Complications:  None    Airway Device:  Oral endotracheal tube    Airway Device Size:  8.0    Style/Cuff Inflation:  Cuffed (inflated to minimal occlusive pressure)    Inflation Amount (mL):  6    Tube secured:  22    Secured at:  The lips    Placement Verified By:  Capnometry    Complicating Factors:  None    Findings Post-Intubation:  BS equal bilateral

## 2024-11-04 NOTE — ANESTHESIA POSTPROCEDURE EVALUATION
Anesthesia Post Evaluation    Patient: Lito Durham    Procedure(s) Performed: Procedure(s) (LRB):  Replacement-valve-aortic (N/A)  PLATING, STERNAL (N/A)  Left atrial appendage closure device (N/A)    Final Anesthesia Type: general      Patient location during evaluation: ICU  Patient participation: No - Unable to Participate, Intubation  Level of consciousness: sedated  Post-procedure vital signs: reviewed and stable  Pain management: adequate  Airway patency: patent    PONV status at discharge: No PONV  Anesthetic complications: no      Cardiovascular status: blood pressure returned to baseline  Respiratory status: ETT and ventilator  Hydration status: euvolemic  Follow-up needed               Vitals Value Taken Time   /69 11/04/24 1132   Temp 36.8 °C (98.3 °F) 11/04/24 1115   Pulse 79 11/04/24 1142   Resp 18 11/04/24 1142   SpO2 100 % 11/04/24 1142   Vitals shown include unfiled device data.      No case tracking events are documented in the log.      Pain/Giorgio Score: No data recorded

## 2024-11-04 NOTE — H&P
[facility] - [facility] ICU  Pulmonary Critical Care Note    Patient Name: Lito Durham  MRN: [id number]  Admission Date: 11/4/2024  Hospital Length of Stay: 0 days  Code Status: Full Code  Attending Provider: Eldon Munoz MD  Primary Care Provider: Cody Khan MD     Subjective:     HPI:   Patient was a 69-year-old male with a past medical history of severe aortic stenosis, hyperlipidemia, hypertension, psoriasis, and KAYLA on CPAP who was admitted for aortic valve replacement with cardiovascular surgery which was conducted on 11/04/2024. Echocardiogram on April 19, 2024 revealing a normal ejection fraction with an aortic valve area of 0.7 sq cm, a mean pressure gradient of 39 mmHg, a peak systolic velocity of 4.3 m/sec and moderate-to-severe aortic insufficiency with normal left ventricular dimensions. CTA reveals severe aortic stenosis with small aortic valve annulus.  Patient admitted to the ICU postoperatively.    Hospital Course/Significant events:  11/04/2024: Patient admitted to the ICU as S/P AVR    24 Hour Interval History:  NA    Past Medical History:   Diagnosis Date    Aortic stenosis     Aortic valve stenosis, etiology of cardiac valve disease unspecified     Cardiac murmur     Digestive disorder     Dizziness     Dyslipidemia     Eczema     Environmental allergies     Hyperlipidemia     Hypertension     Obesity     PONV (postoperative nausea and vomiting)     Psoriasis     Sleep apnea     cpap       Past Surgical History:   Procedure Laterality Date    ANKLE SURGERY Bilateral     CARPAL TUNNEL RELEASE Bilateral     CATARACT EXTRACTION Bilateral     CATHETERIZATION OF BOTH LEFT AND RIGHT HEART N/A 06/04/2024    Procedure: CATHETERIZATION, HEART, BOTH LEFT AND RIGHT;  Surgeon: Rios Paulino MD;  Location: [facility] CATH LAB;  Service: Cardiology;  Laterality: N/A;  RLHC VIA RT GROIN ACCESS    COLONOSCOPY      HERNIA REPAIR      double    THYROID SURGERY      VASECTOMY         Social  History     Socioeconomic History    Marital status: Single   Tobacco Use    Smoking status: Never    Smokeless tobacco: Never   Substance and Sexual Activity    Alcohol use: Yes     Comment: Occasionally    Drug use: Never           Current Outpatient Medications   Medication Instructions    apremilast (OTEZLA) 30 mg Tab 1 tablet, Twice weekly    aspirin (ECOTRIN) 81 mg, Oral, Daily, Last dose 10/29 0800    cyanocobalamin (VITAMIN B-12) 500 mcg    guaiFENesin (MUCUS RELIEF) 400 mg, Daily PRN    krill oil 500 mg Cap 1 capsule, Twice weekly    lisinopriL 10 mg, Oral, Daily, Last dose: 11/3 at 0800    magnesium 200 mg Tab 2 tablets, Nightly    multivitamin (ONE DAILY MULTIVITAMIN) per tablet 1 tablet, Every other day    mupirocin (BACTROBAN) 1 g, Daily    pantoprazole (PROTONIX) 20 mg, Oral, As needed (PRN), Last dose 11/3 at 2200<BR>    rosuvastatin (CRESTOR) 5 mg, Oral, Daily, Last dose: 11/3 at 2200    zinc gluconate 50 mg, Weekly       Current Inpatient Medications   [START ON 11/5/2024] aspirin  81 mg Oral Daily    ceFAZolin (Ancef) IV (PEDS and ADULTS)  2 g Intravenous Q12H    [START ON 11/5/2024] docusate sodium  100 mg Oral BID    [START ON 11/5/2024] enoxparin  40 mg Subcutaneous Daily    famotidine (PF)  20 mg Intravenous Daily    [START ON 11/5/2024] folic acid  1 mg Oral Daily    [START ON 11/5/2024] metoprolol tartrate  12.5 mg Oral BID    mupirocin   Nasal BID    [START ON 11/5/2024] sucralfate  1 g Oral QID (AC & HS)       Current Intravenous Infusions   dexmedeTOMIDine (Precedex) infusion (titrating)  0-1.4 mcg/kg/hr Intravenous Continuous   Stopped at 11/04/24 1130    D5 and 0.45% NaCl   Intravenous Continuous 100 mL/hr at 11/04/24 1114 New Bag at 11/04/24 1114    EPINEPHrine  0-2 mcg/kg/min Intravenous Continuous        insulin regular 1 units/mL infusion orderable (CTS POST-OP)  0-52 Units/hr Intravenous Continuous        loperamide  2 mg Oral Continuous PRN             Review of Systems   Unable to  perform ROS: Intubated          Objective:       Intake/Output Summary (Last 24 hours) at 11/4/2024 1155  Last data filed at 11/4/2024 1127  Gross per 24 hour   Intake 1800 ml   Output 1450 ml   Net 350 ml         Vital Signs (Most Recent):  Temp: 98.3 °F (36.8 °C) (11/04/24 1115)  Pulse: 75 (11/04/24 1115)  Resp: 20 (11/04/24 1150)  BP: 124/77 (11/04/24 1115)  SpO2: (!) 94 % (11/04/24 1115)  Body mass index is 34.27 kg/m².  Weight: 96.3 kg (212 lb 4.9 oz) Vital Signs (24h Range):  Temp:  [98.2 °F (36.8 °C)-98.3 °F (36.8 °C)] 98.3 °F (36.8 °C)  Pulse:  [75-83] 75  Resp:  [18-21] 20  SpO2:  [94 %-99 %] 94 %  BP: (115-137)/(77-87) 124/77  Arterial Line BP: (118)/(89) 118/89     Physical Exam  Vitals reviewed.   Constitutional:       Comments: Patient intubated   Eyes:      Pupils: Pupils are equal, round, and reactive to light.   Cardiovascular:      Rate and Rhythm: Normal rate and regular rhythm.      Heart sounds: No murmur heard.     No friction rub. No gallop.      Comments: Chest tube in place with minimal output  Pulmonary:      Breath sounds: No wheezing, rhonchi or rales.   Abdominal:      General: There is no distension.      Palpations: Abdomen is soft.      Tenderness: There is no abdominal tenderness.   Musculoskeletal:      Right lower leg: No edema.      Left lower leg: No edema.   Neurological:      Comments: Patient intubated but following commands and answering questions appropriately         Lines/Drains/Airways       Central Venous Catheter Line  Duration              Introducer with Double Lumen 11/04/24 0700 Internal Jugular Right <1 day              Drain  Duration                  Chest Tube 11/04/24 1019 Tube - 1 Anterior Mediastinal <1 day         Chest Tube 11/04/24 1019 Tube - 1 Left <1 day         Urethral Catheter 11/04/24 0701 Silicone;Temperature probe 16 Fr. <1 day              Airway  Duration                  Airway - Non-Surgical 11/04/24 0657 <1 day              Arterial Line   Duration             Arterial Line 11/04/24 0628 Right Radial <1 day              Peripheral Intravenous Line  Duration                  Peripheral IV - Single Lumen 11/04/24 0520 18 G Anterior;Distal;Left Forearm <1 day                    Significant Labs:    Lab Results   Component Value Date    WBC 14.62 (H) 11/04/2024    HGB 13.5 (L) 11/04/2024    HCT 34 (L) 11/04/2024    MCV 90.4 11/04/2024     11/04/2024           BMP  Lab Results   Component Value Date     11/04/2024    K 3.6 11/04/2024    CO2 22 (L) 11/04/2024    BUN 15.0 11/04/2024    CREATININE 0.66 (L) 11/04/2024    CALCIUM 10.2 (H) 11/04/2024    AGAP 9.0 11/04/2024         ABG  Recent Labs   Lab 11/04/24  0630 11/04/24  0831 11/04/24  1011   PH 7.420   < > 7.350   PO2 71.0*   < > 277*   PCO2 38.0   < > 44.5   HCO3 24.6   < > 24.5   POCBASEDEF 0.30  --   --     < > = values in this interval not displayed.       Mechanical Ventilation Support:         Significant Imaging:  I have reviewed the pertinent imaging within the past 24 hours.      Assessment/Plan:     Assessment  Severe aortic stenosis S/P AVR on 11/4/24  Leukocytosis  Anemia  History of hypertension  History of hyperlipidemia  History of KAYLA currently on CPAP at home    Plan  Admit to ICU postoperatively  Perform CPAP trial and plan for extubation today  Continue insulin ggt per CV surgery protocol  Currently on no vasoactive drips  Ancef postoperative  Maintain electrolytes at or above goal, replete as needed  MMPC  Up out of bed as tolerated moving forward    DVT Prophylaxis: Lovenox, SCDs  GI Prophylaxis: Pepcid     32 minutes of critical care was time spent personally by me on the following activities: development of treatment plan with patient or surrogate and bedside caregivers, discussions with consultants, evaluation of patient's response to treatment, examination of patient, ordering and performing treatments and interventions, ordering and review of laboratory studies,  ordering and review of radiographic studies, pulse oximetry, re-evaluation of patient's condition.  This critical care time did not overlap with that of any other provider or involve time for any procedures.     Con Andrade DO  Pulmonary Critical Care Medicine  [facility]  DOS: 11/04/2024

## 2024-11-04 NOTE — ANESTHESIA PROCEDURE NOTES
Arterial    Diagnosis: Aortic valve stenosis    Patient location during procedure: holding area  Timeout: 11/4/2024 6:27 AM  Procedure end time: 11/4/2024 6:32 AM    Staffing  Authorizing Provider: Timothy Adam DO  Performing Provider: Shaquille Neil CRNA    Staffing  Performed by: Shaquille Neil CRNA  Authorized by: Timothy Adam DO    Anesthesiologist was present at the time of the procedure.    Preanesthetic Checklist  Completed: patient identified, IV checked, site marked, risks and benefits discussed, surgical consent, monitors and equipment checked, pre-op evaluation, timeout performed and anesthesia consent givenArterial  Skin Prep: chlorhexidine gluconate  Local Infiltration: lidocaine  Orientation: right  Location: radial    Catheter Size: 20 G  Catheter placement by Ultrasound guidance. Heme positive aspiration all ports.   Vessel Caliber: small, patent, compressibility normal  Vascular Doppler:  not done  Needle advanced into vessel with real time Ultrasound guidance.  Guidewire confirmed in vessel.  Sterile sheath used.  Image recorded and saved.Insertion Attempts: 1  Assessment  Dressing: secured with tape and tegaderm  Patient: Tolerated well

## 2024-11-04 NOTE — ANESTHESIA PROCEDURE NOTES
CHAYO    Diagnosis: aortic valve stenosis  Patient location during procedure: OR  Timeout: 11/4/2024 9:35 AM  Procedure end time: 11/4/2024 9:40 AM  Exam type: Final    Staffing  Performed: anesthesiologist     Anesthesiologist: Timothy Adam DO        Anesthesiologist Present  Yes      Setup & Induction  Patient preparation: bite block inserted  Probe Insertion: easy  Exam: limitedDoppler Echo: 2D and color flow mapping.  Exam     Left Heart  Left Atruim: normal and normal (men 3.0-4.0; women 2.7-3.8)      LV Wall Thickness (posterior wall):cm, severly abnormal (men >1.6 cm; women > 1.5 cm)    LVAD  Estimated Ejection Fraction: > 55% normal  Regional Wall Abnormalities: no RWMA            Right Heart  Right Atria:  normal    Intra Atrial Septum  PFO: no shunt by color flow doppler  no IAS aneurysm  no lipomatous hypertrophy  no Atrial Septal Defect (Asd)    Right Ventricle    Aortic Valve:  Stenosis: none.  Morphology: prosthetic valve  Prosthesis: bioprosthetic (no perivalvular leak)    Regurgitation: no aortic valve regurgitation      Mitral Valve:   Morphology:normal  Prolapse: none  Flail: no flail  Jet Description: noneStenosis: no significant stenosis.                  Effusions none    SummaryFindings discussed with surgeon.    Other Findings

## 2024-11-05 LAB
ABO + RH BLD: NORMAL
ALBUMIN SERPL-MCNC: 3.6 G/DL (ref 3.4–4.8)
ALBUMIN/GLOB SERPL: 1.6 RATIO (ref 1.1–2)
ALP SERPL-CCNC: 36 UNIT/L (ref 40–150)
ALT SERPL-CCNC: 15 UNIT/L (ref 0–55)
ANION GAP SERPL CALC-SCNC: 6 MEQ/L
AST SERPL-CCNC: 38 UNIT/L (ref 5–34)
BILIRUB SERPL-MCNC: 1.5 MG/DL
BLD PROD TYP BPU: NORMAL
BLOOD UNIT EXPIRATION DATE: NORMAL
BLOOD UNIT TYPE CODE: 5100
BUN SERPL-MCNC: 12.9 MG/DL (ref 8.4–25.7)
CALCIUM SERPL-MCNC: 7.8 MG/DL (ref 8.8–10)
CHLORIDE SERPL-SCNC: 106 MMOL/L (ref 98–107)
CO2 SERPL-SCNC: 22 MMOL/L (ref 23–31)
CREAT SERPL-MCNC: 0.69 MG/DL (ref 0.72–1.25)
CREAT/UREA NIT SERPL: 19
CROSSMATCH INTERPRETATION: NORMAL
DISPENSE STATUS: NORMAL
ERYTHROCYTE [DISTWIDTH] IN BLOOD BY AUTOMATED COUNT: 13.7 % (ref 11.5–17)
GFR SERPLBLD CREATININE-BSD FMLA CKD-EPI: >60 ML/MIN/1.73/M2
GLOBULIN SER-MCNC: 2.3 GM/DL (ref 2.4–3.5)
GLUCOSE SERPL-MCNC: 154 MG/DL (ref 70–110)
GLUCOSE SERPL-MCNC: 165 MG/DL (ref 70–110)
GLUCOSE SERPL-MCNC: 165 MG/DL (ref 70–110)
GLUCOSE SERPL-MCNC: 165 MG/DL (ref 82–115)
HCT VFR BLD AUTO: 34 % (ref 42–52)
HGB BLD-MCNC: 11.3 G/DL (ref 14–18)
MCH RBC QN AUTO: 29.4 PG (ref 27–31)
MCHC RBC AUTO-ENTMCNC: 33.2 G/DL (ref 33–36)
MCV RBC AUTO: 88.5 FL (ref 80–94)
NRBC BLD AUTO-RTO: 0 %
PLATELET # BLD AUTO: 202 X10(3)/MCL (ref 130–400)
PMV BLD AUTO: 8.7 FL (ref 7.4–10.4)
POCT GLUCOSE: 142 MG/DL (ref 70–110)
POCT GLUCOSE: 145 MG/DL (ref 70–110)
POCT GLUCOSE: 154 MG/DL (ref 70–110)
POCT GLUCOSE: 155 MG/DL (ref 70–110)
POCT GLUCOSE: 158 MG/DL (ref 70–110)
POCT GLUCOSE: 161 MG/DL (ref 70–110)
POCT GLUCOSE: 162 MG/DL (ref 70–110)
POCT GLUCOSE: 163 MG/DL (ref 70–110)
POCT GLUCOSE: 163 MG/DL (ref 70–110)
POTASSIUM SERPL-SCNC: 4.2 MMOL/L (ref 3.5–5.1)
PROT SERPL-MCNC: 5.9 GM/DL (ref 5.8–7.6)
RBC # BLD AUTO: 3.84 X10(6)/MCL (ref 4.7–6.1)
SODIUM SERPL-SCNC: 134 MMOL/L (ref 136–145)
UNIT NUMBER: NORMAL
WBC # BLD AUTO: 13.08 X10(3)/MCL (ref 4.5–11.5)

## 2024-11-05 PROCEDURE — S5010 5% DEXTROSE AND 0.45% SALINE: HCPCS | Performed by: PHYSICIAN ASSISTANT

## 2024-11-05 PROCEDURE — 27000221 HC OXYGEN, UP TO 24 HOURS

## 2024-11-05 PROCEDURE — 94760 N-INVAS EAR/PLS OXIMETRY 1: CPT

## 2024-11-05 PROCEDURE — 36415 COLL VENOUS BLD VENIPUNCTURE: CPT | Performed by: PHYSICIAN ASSISTANT

## 2024-11-05 PROCEDURE — 85027 COMPLETE CBC AUTOMATED: CPT | Performed by: PHYSICIAN ASSISTANT

## 2024-11-05 PROCEDURE — 97110 THERAPEUTIC EXERCISES: CPT

## 2024-11-05 PROCEDURE — 80053 COMPREHEN METABOLIC PANEL: CPT | Performed by: PHYSICIAN ASSISTANT

## 2024-11-05 PROCEDURE — 25000003 PHARM REV CODE 250: Performed by: THORACIC SURGERY (CARDIOTHORACIC VASCULAR SURGERY)

## 2024-11-05 PROCEDURE — 11000001 HC ACUTE MED/SURG PRIVATE ROOM

## 2024-11-05 PROCEDURE — 63600175 PHARM REV CODE 636 W HCPCS: Performed by: THORACIC SURGERY (CARDIOTHORACIC VASCULAR SURGERY)

## 2024-11-05 PROCEDURE — 21400001 HC TELEMETRY ROOM

## 2024-11-05 PROCEDURE — 63600175 PHARM REV CODE 636 W HCPCS: Performed by: PHYSICIAN ASSISTANT

## 2024-11-05 PROCEDURE — 25000003 PHARM REV CODE 250: Performed by: PHYSICIAN ASSISTANT

## 2024-11-05 RX ORDER — POLYETHYLENE GLYCOL 3350 17 G/17G
17 POWDER, FOR SOLUTION ORAL DAILY PRN
OUTPATIENT
Start: 2024-11-05

## 2024-11-05 RX ORDER — PANTOPRAZOLE SODIUM 20 MG/1
20 TABLET, DELAYED RELEASE ORAL
Status: DISCONTINUED | OUTPATIENT
Start: 2024-11-05 | End: 2024-11-08 | Stop reason: HOSPADM

## 2024-11-05 RX ORDER — ATORVASTATIN CALCIUM 40 MG/1
40 TABLET, FILM COATED ORAL DAILY
Status: DISCONTINUED | OUTPATIENT
Start: 2024-11-05 | End: 2024-11-08 | Stop reason: HOSPADM

## 2024-11-05 RX ORDER — BISACODYL 10 MG/1
10 SUPPOSITORY RECTAL DAILY PRN
OUTPATIENT
Start: 2024-11-05

## 2024-11-05 RX ORDER — LANOLIN ALCOHOL/MO/W.PET/CERES
400 CREAM (GRAM) TOPICAL NIGHTLY
Status: DISCONTINUED | OUTPATIENT
Start: 2024-11-05 | End: 2024-11-08 | Stop reason: HOSPADM

## 2024-11-05 RX ORDER — ASPIRIN 81 MG/1
81 TABLET ORAL DAILY
OUTPATIENT
Start: 2024-11-05

## 2024-11-05 RX ORDER — DOCUSATE SODIUM 100 MG/1
200 CAPSULE, LIQUID FILLED ORAL 2 TIMES DAILY
OUTPATIENT
Start: 2024-11-05

## 2024-11-05 RX ORDER — MUPIROCIN 20 MG/G
OINTMENT TOPICAL 2 TIMES DAILY
OUTPATIENT
Start: 2024-11-05 | End: 2024-11-10

## 2024-11-05 RX ORDER — SIMETHICONE 80 MG
1 TABLET,CHEWABLE ORAL ONCE
Status: COMPLETED | OUTPATIENT
Start: 2024-11-05 | End: 2024-11-05

## 2024-11-05 RX ADMIN — OXYCODONE HYDROCHLORIDE 10 MG: 10 TABLET ORAL at 06:11

## 2024-11-05 RX ADMIN — ASPIRIN 81 MG: 81 TABLET, COATED ORAL at 08:11

## 2024-11-05 RX ADMIN — FOLIC ACID 1 MG: 1 TABLET ORAL at 08:11

## 2024-11-05 RX ADMIN — DOCUSATE SODIUM 100 MG: 100 CAPSULE, LIQUID FILLED ORAL at 08:11

## 2024-11-05 RX ADMIN — SUCRALFATE 1 G: 1 TABLET ORAL at 06:11

## 2024-11-05 RX ADMIN — Medication 400 MG: at 08:11

## 2024-11-05 RX ADMIN — SIMETHICONE 80 MG: 80 TABLET, CHEWABLE ORAL at 05:11

## 2024-11-05 RX ADMIN — ENOXAPARIN SODIUM 40 MG: 40 INJECTION SUBCUTANEOUS at 04:11

## 2024-11-05 RX ADMIN — DEXTROSE AND SODIUM CHLORIDE: 5; 450 INJECTION, SOLUTION INTRAVENOUS at 01:11

## 2024-11-05 RX ADMIN — SUCRALFATE 1 G: 1 TABLET ORAL at 11:11

## 2024-11-05 RX ADMIN — METOPROLOL TARTRATE 12.5 MG: 25 TABLET, FILM COATED ORAL at 08:11

## 2024-11-05 RX ADMIN — OXYCODONE HYDROCHLORIDE 10 MG: 10 TABLET ORAL at 03:11

## 2024-11-05 RX ADMIN — FAMOTIDINE 20 MG: 10 INJECTION, SOLUTION INTRAVENOUS at 08:11

## 2024-11-05 RX ADMIN — MUPIROCIN: 20 OINTMENT TOPICAL at 08:11

## 2024-11-05 RX ADMIN — PANTOPRAZOLE SODIUM 20 MG: 20 TABLET, DELAYED RELEASE ORAL at 04:11

## 2024-11-05 RX ADMIN — SUCRALFATE 1 G: 1 TABLET ORAL at 04:11

## 2024-11-05 RX ADMIN — ACETAMINOPHEN 1000 MG: 10 INJECTION, SOLUTION INTRAVENOUS at 06:11

## 2024-11-05 RX ADMIN — CEFAZOLIN SODIUM 2 G: 2 SOLUTION INTRAVENOUS at 07:11

## 2024-11-05 RX ADMIN — ATORVASTATIN CALCIUM 40 MG: 40 TABLET, FILM COATED ORAL at 08:11

## 2024-11-05 RX ADMIN — SUCRALFATE 1 G: 1 TABLET ORAL at 08:11

## 2024-11-05 NOTE — PROGRESS NOTES
[facility] - [facility]  Pulmonary Critical Care Note    Patient Name: Lito Durham  MRN: [id number]  Admission Date: 11/4/2024  Hospital Length of Stay: 1 days  Code Status: Full Code  Attending Provider: Eldon Munoz MD  Primary Care Provider: Cody Khan MD     Subjective:     HPI:   Patient was a 69-year-old male with a past medical history of severe aortic stenosis, hyperlipidemia, hypertension, psoriasis, and KAYLA on CPAP who was admitted for aortic valve replacement with cardiovascular surgery which was conducted on 11/04/2024. Echocardiogram on April 19, 2024 revealing a normal ejection fraction with an aortic valve area of 0.7 sq cm, a mean pressure gradient of 39 mmHg, a peak systolic velocity of 4.3 m/sec and moderate-to-severe aortic insufficiency with normal left ventricular dimensions. CTA reveals severe aortic stenosis with small aortic valve annulus.  Patient admitted to the ICU postoperatively.     Hospital Course/Significant events:  11/04/2024: Patient admitted to the ICU as S/P AVR     24 Hour Interval History:  No acute events overnight.  Patient is sitting up in bedside chair this morning reports that he is feeling well overall.  No significant change in labs from prior.  Stable for downgrade to floor today.    Past Medical History:   Diagnosis Date    Aortic stenosis     Aortic valve stenosis, etiology of cardiac valve disease unspecified     Cardiac murmur     Digestive disorder     Dizziness     Dyslipidemia     Eczema     Environmental allergies     Hyperlipidemia     Hypertension     Obesity     PONV (postoperative nausea and vomiting)     Psoriasis     Sleep apnea     cpap       Past Surgical History:   Procedure Laterality Date    ANKLE SURGERY Bilateral     AORTIC VALVE REPLACEMENT N/A 11/4/2024    Procedure: Replacement-valve-aortic;  Surgeon: Eldon Munoz MD;  Location: [facility];  Service: Cardiovascular;  Laterality: N/A;  ECHO NOTIFIED    CARPAL TUNNEL  RELEASE Bilateral     CATARACT EXTRACTION Bilateral     CATHETERIZATION OF BOTH LEFT AND RIGHT HEART N/A 06/04/2024    Procedure: CATHETERIZATION, HEART, BOTH LEFT AND RIGHT;  Surgeon: Rios Paulino MD;  Location: [facility] CATH LAB;  Service: Cardiology;  Laterality: N/A;  RLHC VIA RT GROIN ACCESS    CLOSURE OF LEFT ATRIAL APPENDAGE USING DEVICE N/A 11/4/2024    Procedure: Left atrial appendage closure device;  Surgeon: Eldon Munoz MD;  Location: [facility] OR;  Service: Cardiology;  Laterality: N/A;    COLONOSCOPY      HERNIA REPAIR      double    PLATING OF STERNUM N/A 11/4/2024    Procedure: PLATING, STERNAL;  Surgeon: Eldon Munoz MD;  Location: [facility] OR;  Service: Cardiovascular;  Laterality: N/A;    THYROID SURGERY      VASECTOMY         Social History     Socioeconomic History    Marital status: Single   Tobacco Use    Smoking status: Never    Smokeless tobacco: Never   Substance and Sexual Activity    Alcohol use: Yes     Comment: Occasionally    Drug use: Never     Social Drivers of Health     Financial Resource Strain: Patient Unable To Answer (11/4/2024)    Overall Financial Resource Strain (CARDIA)     Difficulty of Paying Living Expenses: Patient unable to answer   Food Insecurity: Patient Unable To Answer (11/4/2024)    Hunger Vital Sign     Worried About Running Out of Food in the Last Year: Patient unable to answer     Ran Out of Food in the Last Year: Patient unable to answer   Transportation Needs: Patient Unable To Answer (11/4/2024)    TRANSPORTATION NEEDS     Transportation : Patient unable to answer   Stress: Patient Unable To Answer (11/4/2024)    [employer] - Occupational Stress Questionnaire     Feeling of Stress : Patient unable to answer   Housing Stability: Patient Unable To Answer (11/4/2024)    Housing Stability Vital Sign     Unable to Pay for Housing in the Last Year: Patient unable to answer     Homeless in the Last Year: Patient unable to answer            Current Outpatient Medications   Medication Instructions    apremilast (OTEZLA) 30 mg Tab 1 tablet, Twice weekly    aspirin (ECOTRIN) 81 mg, Oral, Daily, Last dose 10/29 0800    cyanocobalamin (VITAMIN B-12) 500 mcg    guaiFENesin (MUCUS RELIEF) 400 mg, Daily PRN    krill oil 500 mg Cap 1 capsule, Twice weekly    lisinopriL 10 mg, Oral, Daily, Last dose: 11/3 at 0800    magnesium 200 mg Tab 2 tablets, Nightly    multivitamin (ONE DAILY MULTIVITAMIN) per tablet 1 tablet, Every other day    pantoprazole (PROTONIX) 20 mg, Oral, As needed (PRN), Last dose 11/3 at 2200<BR>    rosuvastatin (CRESTOR) 5 mg, Oral, Daily, Last dose: 11/3 at 2200    zinc gluconate 50 mg, Weekly       Current Inpatient Medications   aspirin  81 mg Oral Daily    atorvastatin  40 mg Oral Daily    docusate sodium  100 mg Oral BID    enoxparin  40 mg Subcutaneous Daily    famotidine (PF)  20 mg Intravenous Daily    folic acid  1 mg Oral Daily    magnesium oxide  400 mg Oral Nightly    metoprolol tartrate  12.5 mg Oral BID    mupirocin   Nasal BID    sucralfate  1 g Oral QID (AC & HS)       Current Intravenous Infusions   dexmedeTOMIDine (Precedex) infusion (titrating)  0-1.4 mcg/kg/hr Intravenous Continuous   Stopped at 11/04/24 1130    D5 and 0.45% NaCl   Intravenous Continuous 100 mL/hr at 11/05/24 1040 Rate Verify at 11/05/24 1040    electrolyte-A   Intravenous Continuous        EPINEPHrine  0-2 mcg/kg/min Intravenous Continuous        insulin regular 1 units/mL infusion orderable (CTS POST-OP)  0-52 Units/hr Intravenous Continuous   Stopped at 11/04/24 1704    loperamide  2 mg Oral Continuous PRN             Review of Systems   Constitutional:  Negative for chills, fever and malaise/fatigue.   Respiratory:  Negative for cough, hemoptysis, sputum production and shortness of breath.    Cardiovascular:  Negative for chest pain, palpitations and leg swelling.        Chest wall pain postsurgical   Gastrointestinal: Negative.     Genitourinary: Negative.    Neurological: Negative.           Objective:       Intake/Output Summary (Last 24 hours) at 11/5/2024 1046  Last data filed at 11/5/2024 1040  Gross per 24 hour   Intake 5450.64 ml   Output 2944 ml   Net 2506.64 ml         Vital Signs (Most Recent):  Temp: 98.5 °F (36.9 °C) (11/05/24 0800)  Pulse: 86 (11/05/24 1030)  Resp: (!) 33 (11/05/24 1030)  BP: 119/84 (11/05/24 1030)  SpO2: 95 % (11/05/24 1030)  Body mass index is 36.01 kg/m².  Weight: 101.2 kg (223 lb 1.7 oz) Vital Signs (24h Range):  Temp:  [98.3 °F (36.8 °C)-99.1 °F (37.3 °C)] 98.5 °F (36.9 °C)  Pulse:  [] 86  Resp:  [0-33] 33  SpO2:  [90 %-100 %] 95 %  BP: ()/(63-99) 119/84  Arterial Line BP: ()/(-) 106/72     Physical Exam  Vitals reviewed.   Constitutional:       General: He is not in acute distress.     Comments: Sitting in bedside chair in no acute distress   Cardiovascular:      Rate and Rhythm: Normal rate and regular rhythm.      Heart sounds: No murmur heard.     No friction rub. No gallop.      Comments: lDressing clean dry and intact  Chest tubes in place  Pulmonary:      Breath sounds: No wheezing, rhonchi or rales.   Abdominal:      General: There is no distension.      Tenderness: There is no abdominal tenderness.   Musculoskeletal:      Right lower leg: No edema.      Left lower leg: No edema.   Neurological:      General: No focal deficit present.      Mental Status: He is alert and oriented to person, place, and time.       Lines/Drains/Airways       Central Venous Catheter Line  Duration              Introducer with Double Lumen 11/04/24 0700 Internal Jugular Right 1 day              Drain  Duration                  Chest Tube 11/04/24 1019 Tube - 1 Anterior Mediastinal 1 day         Chest Tube 11/04/24 1019 Tube - 1 Left 1 day         Urethral Catheter 11/04/24 0701 Silicone;Temperature probe 16 Fr. 1 day              Line  Duration                  Pacer Wires 11/04/24 1100 <1 day               Peripheral Intravenous Line  Duration                  Peripheral IV - Single Lumen 11/04/24 0520 18 G Anterior;Distal;Left Forearm 1 day                    Significant Labs:    Lab Results   Component Value Date    WBC 13.08 (H) 11/05/2024    HGB 11.3 (L) 11/05/2024    HCT 34.0 (L) 11/05/2024    MCV 88.5 11/05/2024     11/05/2024           BMP  Lab Results   Component Value Date     (L) 11/05/2024    K 4.2 11/05/2024    CO2 22 (L) 11/05/2024    BUN 12.9 11/05/2024    CREATININE 0.69 (L) 11/05/2024    CALCIUM 7.8 (L) 11/05/2024    AGAP 6.0 11/05/2024         ABG  Recent Labs   Lab 11/04/24  1350   PH 7.490*   PO2 136.0*   PCO2 36.0   HCO3 27.4*   POCBASEDEF 4.00       Mechanical Ventilation Support:  Vent Mode: CPAP / PSV (11/04/24 1319)  Ventilator Initiated: Yes (11/04/24 1105)  Set Rate: 20 BPM (11/04/24 1105)  Vt Set: 500 mL (11/04/24 1105)  Pressure Support: 10 cmH20 (11/04/24 1319)  PEEP/CPAP: 5 cmH20 (11/04/24 1319)  Oxygen Concentration (%): 40 (11/04/24 1319)  Peak Airway Pressure: 16 cmH20 (11/04/24 1319)  Total Ve: 12.7 L/m (11/04/24 1319)  F/VT Ratio<105 (RSBI): (!) 44.94 (11/04/24 1105)      Significant Imaging:  I have reviewed the pertinent imaging within the past 24 hours.    Assessment/Plan:     Assessment  Severe aortic stenosis S/P AVR on 11/4/24  Leukocytosis  Anemia  History of hypertension  History of hyperlipidemia  History of KAYLA currently on CPAP at home     Plan  Stable to downgrade to floor today  No significant changes on labs this morning  Extubated yesterday without complication and patient currently tolerating well  Continue with blood pressure and glucose control  Maintain electrolytes at or above goal, replete as needed  MMPC  PT/OT as tolerated     DVT Prophylaxis: Lovenox, SCDs  GI Prophylaxis: Pepcid     32 minutes of critical care was time spent personally by me on the following activities: development of treatment plan with patient or surrogate and  bedside caregivers, discussions with consultants, evaluation of patient's response to treatment, examination of patient, ordering and performing treatments and interventions, ordering and review of laboratory studies, ordering and review of radiographic studies, pulse oximetry, re-evaluation of patient's condition.  This critical care time did not overlap with that of any other provider or involve time for any procedures.     Con Andrade DO  Pulmonary Critical Care Medicine  [facility]  DOS: 11/05/2024

## 2024-11-05 NOTE — PROGRESS NOTES
11/05/24 1400   Pre Exercise Vitals   /88   Pulse 97   Supplemental O2? No   SpO2 95 %   During Exercise Vitals   Pulse 110   Supplemental O2? No   SpO2 93 %   Distance Walked 100 feet   Post Exercise Vitals   BP (!) 135/93   Pulse 98   Supplemental O2? No   SpO2 94 %   Modality   Modality   (rollator)     Communicated with nurse prior to and post activity.   Pt currently on commode, CT x2 cont.  Family members x2 present.  Pt agreeable to ambulate.  Sit to stand independently, maintains sternal precautions.   Gait steady with rollator, mild ROJAS noted.  Returned to room; all monitors reconnected.  Encouraged increased activity and use of IS.

## 2024-11-05 NOTE — PROGRESS NOTES
Cardiothoracic Surgery   Daily Progress Note     HD#1  POD#1 Day Post-Op    Subjective  POD 1 s/p AVR, NIDHI ligation  Doing well in ICU  In Chair, extubated  On no vasoactive drips  Pain controlled  3.6L UOP via driver  Cts 290 and 154 cc serosanguinous  Afebrile     Scheduled Meds:   aspirin  81 mg Oral Daily    ceFAZolin (Ancef) IV (PEDS and ADULTS)  2 g Intravenous Q12H    docusate sodium  100 mg Oral BID    enoxparin  40 mg Subcutaneous Daily    famotidine (PF)  20 mg Intravenous Daily    folic acid  1 mg Oral Daily    metoprolol tartrate  12.5 mg Oral BID    mupirocin   Nasal BID    sucralfate  1 g Oral QID (AC & HS)       Continuous Infusions:   dexmedeTOMIDine (Precedex) infusion (titrating)  0-1.4 mcg/kg/hr Intravenous Continuous   Stopped at 11/04/24 1130    D5 and 0.45% NaCl   Intravenous Continuous 100 mL/hr at 11/05/24 0538 Rate Verify at 11/05/24 0538    electrolyte-A   Intravenous Continuous        EPINEPHrine  0-2 mcg/kg/min Intravenous Continuous        insulin regular 1 units/mL infusion orderable (CTS POST-OP)  0-52 Units/hr Intravenous Continuous   Stopped at 11/04/24 1704    loperamide  2 mg Oral Continuous PRN           PRN Meds:  Current Facility-Administered Medications:     acetaminophen, 650 mg, Per OG tube, Q6H PRN    albumin human 5%, 12.5 g, Intravenous, PRN    calcium gluconate IVPB, 1 g, Intravenous, PRN    calcium gluconate IVPB, 2 g, Intravenous, PRN    calcium gluconate IVPB, 3 g, Intravenous, PRN    dextrose 10%, 12.5 g, Intravenous, PRN    dextrose 10%, 25 g, Intravenous, PRN    HYDROcodone-acetaminophen, 1 tablet, Oral, Q4H PRN    lactulose 10 gram/15 ml, 20 g, Oral, Q6H PRN    loperamide, 2 mg, Oral, Continuous PRN    magnesium sulfate IVPB, 2 g, Intravenous, PRN    magnesium sulfate IVPB, 4 g, Intravenous, PRN    metoclopramide, 5 mg, Intravenous, Q6H PRN    morphine, 4 mg, Intravenous, Q4H PRN    ondansetron, 4 mg, Intravenous, Q4H PRN    oxyCODONE, 10 mg, Oral, Q4H PRN     potassium chloride in water, 20 mEq, Intravenous, PRN    potassium chloride in water, 60 mEq, Intravenous, PRN    potassium chloride in water, 40 mEq, Intravenous, PRN     Objective  Temp:  [98.3 °F (36.8 °C)-99.1 °F (37.3 °C)] 98.3 °F (36.8 °C)  Pulse:  [] 80  Resp:  [0-31] 28  SpO2:  [94 %-100 %] 97 %  BP: ()/(63-99) 108/73  Arterial Line BP: ()/(-) 106/72     I/O last 3 completed shifts:  In: 4211.7 [I.V.:2111.8; Blood:1250; IV Piggyback:849.9]  Out: 3265 [Urine:3025; Chest Tube:240]  I/O this shift:  In: 2319.6 [I.V.:2172.9; IV Piggyback:146.7]  Out: 779 [Urine:575; Chest Tube:204]     GEN: Nad, in chair  NEURO: AAOx3  RESP: No increased WOB, on 2 L O2 NC  CV: RR, not paced, Cts x 2 serosanguinous, sternotomy incision dressing c/d/i  ABD: S, NT, ND, no guarding or rebound  : Dennis in place and clear  EXT: moving all spontaneously     Labs  Recent Labs     11/04/24  1002 11/04/24  1011 11/04/24  1211 11/04/24  1507 11/05/24  0322   WBC 14.62*  --  16.83*  --  13.08*   HGB 13.5*  --  11.8* 11.6* 11.3*   HCT 41.5* 34* 34.9* 34.1* 34.0*     --  171  --  202   INR 1.4*  --   --  1.3  --      Recent Labs     11/04/24  1002 11/04/24  1211 11/04/24  1507 11/05/24  0322    140  --  134*   K 3.6 4.6 3.8 4.2   * 106  --  106   CO2 22* 24  --  22*   BUN 15.0 13.9  --  12.9   CREATININE 0.66* 0.67*  --  0.69*   CALCIUM 10.2* 8.3*  --  7.8*   MG  --  2.60  --   --    PHOS  --  3.3  --   --    ALBUMIN  --  3.1*  --  3.6   BILITOT  --  1.7*  --  1.5   AST  --  35*  --  38*   ALKPHOS  --  38*  --  36*   ALT  --  15  --  15       Imaging  X-Ray Chest AP Portable   Final Result      Interval removal of endotracheal tube and nasogastric tube.      Increased left retrocardiac density and silhouetting of the left hemidiaphragm which might be related to an infiltrate/atelectasis.      No other significant change         Electronically signed by: Dustin Hernandez   Date:    11/05/2024    Time:    05:24      X-Ray Chest AP Portable   Final Result      Postoperative changes         Electronically signed by: Dustin Hernandez   Date:    11/04/2024   Time:    12:42             Assessment/Plan  69 M POD 1 AVR and NIDHI ligation    Labs and imaging reviewed.    Diet  MMPC  CTs to suction  Daily CXR  Strict I&Os  ASA  Bowel regimen  DVT ppx  Remove driver  De-line  Therapy, cardiac rehab  Out of bed as tolerated  Likely transfer to floor later today    Gal Yip MD  Surgery PGY 4      11/5/2024  6:55 AM

## 2024-11-05 NOTE — OP NOTE
[facility]  Cardiothoracic Surgery  Operative Note    SUMMARY     Date of Procedure: 11/4/2024     Procedure:  1.  Aortic valve replacement with a 21 mm Nova Magna ease bioprosthesis   2.  Left atrial appendage ligation with a 40 mm atrial clip device   3.  Plating of sternal defect with the [place] sternal plating system and stainless steel wire     Surgeon: ABDI Munoz     Assistant: Theodora Gaona    Pre-Operative Diagnosis:  1.  Severe aortic stenosis   2. Obesity   3. Obstructive sleep apnea   Four.  Dyslipidemia    Post-Operative Diagnosis:  Same    Anesthesia: General    Operative Findings (including complications, if any):  Dictated    Description of Technical Procedures: Patient was delivered to the operating room, support lines were placed, general endotracheal anesthesia was induced.  Patient was prepped and draped in usual sterile fashion.  Median sternotomy was made .  Heparin was administered, once the ACT was satisfactory the patient was cannulated via the ascending aorta and right atrial appendage and placed on cardiopulmonary bypass.  Aortic cross-clamp was applied and cardioplegia was given down the aortic root to promote diastolic arrest.  Topical cold saline was also used to promote hypothermia.  CO2 insufflation was used in the pericardial well throughout the entire case.   The aortotomy was made with an 11 blade and extended with Metzenbaum scissors.  The aortic valve was meticulously excised taking care to capture all particulate debris.  The left ventricle was irrigated with copious amounts of saline irrigation.  Interrupted horizontal mattress sutures of 2-0 Ethibond were placed with the pledgets on the inflow side.  The valve sutures were then carried through the valve prosthesis sewing cuff.  The  prosthesis was then lowered in the place and with the aid of the cor knot device the sutures were tied.  Left atrial appendage was ligated with an  atrial clip device as noted above.  The aorta was then closed in 2 layers of 4-0 Prolene in a Kimberley fashion.  De-airing ensued.  Following de-airing a warm shot of blood was given down the aortic root and aortic cross-clamp was removed.  The heart was reanimated and the patient was weaned and discontinued from cardiopulmonary bypass.  Right atrial and right ventricular epicardial temporary pacing wires were placed, as were 2-24 [place] Dimitry drains, 1 in the chest and 1 in the mediastinum.  Protamine was administered and the patient was decannulated.  The sternum was closed with stainless steel wire, and the [place] sternal plating system.Subcutaneous tissues were closed in layers with Vicryl.  Patient tolerated the procedure well will be delivered to the recovery room in stable condition.       Estimated Blood Loss (EBL): N/A          Specimens:   Specimen (24h ago, onward)      None                    Condition: Stable    Disposition: ICU - intubated and hemodynamically stable.

## 2024-11-06 LAB
ALBUMIN SERPL-MCNC: 3.3 G/DL (ref 3.4–4.8)
ALBUMIN/GLOB SERPL: 1.4 RATIO (ref 1.1–2)
ALP SERPL-CCNC: 48 UNIT/L (ref 40–150)
ALT SERPL-CCNC: 14 UNIT/L (ref 0–55)
ANION GAP SERPL CALC-SCNC: 9 MEQ/L
AST SERPL-CCNC: 34 UNIT/L (ref 5–34)
BILIRUB SERPL-MCNC: 1.8 MG/DL
BUN SERPL-MCNC: 13.2 MG/DL (ref 8.4–25.7)
CALCIUM SERPL-MCNC: 8.2 MG/DL (ref 8.8–10)
CHLORIDE SERPL-SCNC: 101 MMOL/L (ref 98–107)
CO2 SERPL-SCNC: 25 MMOL/L (ref 23–31)
CREAT SERPL-MCNC: 0.71 MG/DL (ref 0.72–1.25)
CREAT/UREA NIT SERPL: 19
ERYTHROCYTE [DISTWIDTH] IN BLOOD BY AUTOMATED COUNT: 13.7 % (ref 11.5–17)
GFR SERPLBLD CREATININE-BSD FMLA CKD-EPI: >60 ML/MIN/1.73/M2
GLOBULIN SER-MCNC: 2.3 GM/DL (ref 2.4–3.5)
GLUCOSE SERPL-MCNC: 108 MG/DL (ref 82–115)
HCT VFR BLD AUTO: 34.3 % (ref 42–52)
HGB BLD-MCNC: 11.2 G/DL (ref 14–18)
MCH RBC QN AUTO: 29.4 PG (ref 27–31)
MCHC RBC AUTO-ENTMCNC: 32.7 G/DL (ref 33–36)
MCV RBC AUTO: 90 FL (ref 80–94)
NRBC BLD AUTO-RTO: 0 %
PLATELET # BLD AUTO: 197 X10(3)/MCL (ref 130–400)
PMV BLD AUTO: 8.8 FL (ref 7.4–10.4)
POCT GLUCOSE: 146 MG/DL (ref 70–110)
POCT GLUCOSE: 149 MG/DL (ref 70–110)
POTASSIUM SERPL-SCNC: 4.3 MMOL/L (ref 3.5–5.1)
PROT SERPL-MCNC: 5.6 GM/DL (ref 5.8–7.6)
PSYCHE PATHOLOGY RESULT: NORMAL
RBC # BLD AUTO: 3.81 X10(6)/MCL (ref 4.7–6.1)
SODIUM SERPL-SCNC: 135 MMOL/L (ref 136–145)
WBC # BLD AUTO: 16.24 X10(3)/MCL (ref 4.5–11.5)

## 2024-11-06 PROCEDURE — 25000003 PHARM REV CODE 250: Performed by: PHYSICIAN ASSISTANT

## 2024-11-06 PROCEDURE — 80053 COMPREHEN METABOLIC PANEL: CPT | Performed by: PHYSICIAN ASSISTANT

## 2024-11-06 PROCEDURE — 63600175 PHARM REV CODE 636 W HCPCS: Performed by: PHYSICIAN ASSISTANT

## 2024-11-06 PROCEDURE — 36415 COLL VENOUS BLD VENIPUNCTURE: CPT | Performed by: PHYSICIAN ASSISTANT

## 2024-11-06 PROCEDURE — 94760 N-INVAS EAR/PLS OXIMETRY 1: CPT

## 2024-11-06 PROCEDURE — 97110 THERAPEUTIC EXERCISES: CPT

## 2024-11-06 PROCEDURE — 11000001 HC ACUTE MED/SURG PRIVATE ROOM

## 2024-11-06 PROCEDURE — 21400001 HC TELEMETRY ROOM

## 2024-11-06 PROCEDURE — 85027 COMPLETE CBC AUTOMATED: CPT | Performed by: PHYSICIAN ASSISTANT

## 2024-11-06 PROCEDURE — 99024 POSTOP FOLLOW-UP VISIT: CPT | Mod: POP,,, | Performed by: PHYSICIAN ASSISTANT

## 2024-11-06 RX ADMIN — DOCUSATE SODIUM 100 MG: 100 CAPSULE, LIQUID FILLED ORAL at 08:11

## 2024-11-06 RX ADMIN — ASPIRIN 81 MG: 81 TABLET, COATED ORAL at 08:11

## 2024-11-06 RX ADMIN — SUCRALFATE 1 G: 1 TABLET ORAL at 04:11

## 2024-11-06 RX ADMIN — ATORVASTATIN CALCIUM 40 MG: 40 TABLET, FILM COATED ORAL at 08:11

## 2024-11-06 RX ADMIN — METOPROLOL TARTRATE 12.5 MG: 25 TABLET, FILM COATED ORAL at 08:11

## 2024-11-06 RX ADMIN — SUCRALFATE 1 G: 1 TABLET ORAL at 08:11

## 2024-11-06 RX ADMIN — ENOXAPARIN SODIUM 40 MG: 40 INJECTION SUBCUTANEOUS at 04:11

## 2024-11-06 RX ADMIN — Medication 400 MG: at 08:11

## 2024-11-06 RX ADMIN — FAMOTIDINE 20 MG: 10 INJECTION, SOLUTION INTRAVENOUS at 08:11

## 2024-11-06 RX ADMIN — FOLIC ACID 1 MG: 1 TABLET ORAL at 08:11

## 2024-11-06 NOTE — PROGRESS NOTES
CT SURGERY PROGRESS NOTE  Lito Durham  69 y.o.  1955    Patients Procedure: Procedure(s) (LRB):  Replacement-valve-aortic (N/A)  PLATING, STERNAL (N/A)  Left atrial appendage closure device (N/A)    Subjective  Interval History: POD 3    ROS    Medication List  Infusions   dexmedeTOMIDine (Precedex) infusion (titrating)  0-1.4 mcg/kg/hr Intravenous Continuous   Stopped at 11/04/24 1130    D5 and 0.45% NaCl   Intravenous Continuous   Stopped at 11/05/24 1119    electrolyte-A   Intravenous Continuous        EPINEPHrine  0-2 mcg/kg/min Intravenous Continuous        insulin regular 1 units/mL infusion orderable (CTS POST-OP)  0-52 Units/hr Intravenous Continuous   Stopped at 11/04/24 1704    loperamide  2 mg Oral Continuous PRN         Scheduled   aspirin  81 mg Oral Daily    atorvastatin  40 mg Oral Daily    docusate sodium  100 mg Oral BID    enoxparin  40 mg Subcutaneous Daily    famotidine (PF)  20 mg Intravenous Daily    folic acid  1 mg Oral Daily    magnesium oxide  400 mg Oral Nightly    metoprolol tartrate  12.5 mg Oral BID    mupirocin   Nasal BID    sucralfate  1 g Oral QID (AC & HS)       Objective:  Recent Vitals:  Temp:  [98.5 °F (36.9 °C)-99.4 °F (37.4 °C)] 99.1 °F (37.3 °C)  Pulse:  [] 92  Resp:  [7-34] 24  SpO2:  [87 %-97 %] 95 %  BP: ()/(66-99) 144/98    Physical Exam     I/O last 24 hrs:  Intake/Output - Last 3 Shifts         11/04 0700 11/05 0659 11/05 0700 11/06 0659 11/06 0700 11/07 0659    I.V. (mL/kg) 4284.7 (42.3) 442.2 (4.2)     Blood 1250      IV Piggyback 996.6 142.9     Total Intake(mL/kg) 6531.3 (64.5) 585.1 (5.6)     Urine (mL/kg/hr) 3600 (1.5) 385 (0.2)     Chest Tube 444 205     Total Output 4044 590     Net +2487.3 -4.9            Urine Occurrence  4 x             Labs  BMP:   Recent Labs   Lab 11/04/24  1211 11/04/24  1507 11/06/24  0605      < > 135*   K 4.6   < > 4.3      < > 101   CO2 24   < > 25   BUN 13.9   < > 13.2   CREATININE 0.67*   < >  0.71*   CALCIUM 8.3*   < > 8.2*   MG 2.60  --   --     < > = values in this interval not displayed.     CBC:   Recent Labs   Lab 11/06/24  0605   WBC 16.24*   RBC 3.81*   HGB 11.2*   HCT 34.3*      MCV 90.0   MCH 29.4   MCHC 32.7*     CMP:   Recent Labs   Lab 11/06/24  0605   CALCIUM 8.2*   ALBUMIN 3.3*   *   K 4.3   CO2 25      BUN 13.2   CREATININE 0.71*   ALKPHOS 48   ALT 14   AST 34   BILITOT 1.8*         Imaging:   CXR: X-Ray Chest AP Portable    Result Date: 11/6/2024  Mild left lower lung atelectasis. Electronically signed by: Ricky Vásquez Date:    11/06/2024 Time:    06:04         ASSESSMENT/PLAN:    POD 3  Doing well   Mobilize  DC planning - today?    Case and plan of care discussed with MD Eugene Eldridge PA-C

## 2024-11-06 NOTE — PROGRESS NOTES
11/06/24 0830   Pre Exercise Vitals   /79   Pulse 112  (ST)   Supplemental O2? No   SpO2 95 %   During Exercise Vitals   Pulse 122   Supplemental O2? No   SpO2 94 %   Distance Walked 200 feet   Post Exercise Vitals   /88   Pulse 114   Modality   Modality   (rollator)     Communicated with nurse prior to and post activity.   Sit to stand independently, maintains sternal precautions.   Gait steady with rollator, mild ROJAS noted.  Encouraged increased activity and use of IS.   -750ml.

## 2024-11-06 NOTE — PLAN OF CARE
Spoke with pt, his sister Liv Chavez [phone] who is at bedside with additional family members  Pt lives alone, [facility], No steps,has rollator, lift chair and tub bench.   PCP is Cody Khan  Medicare A&B and supplement is [employer].  Pt plans to go to his home and Liv plans to assist him as much as needed. Pt is considering going to his sisters home but at this time is planning on going to his home.   Osvaldo Jt pederson received referral from  prior to admit per Mavis Colmenares [phone] who called today to make sure  knew. She has been sent info  to [phone]. She would like dc info faxed when appropriate. She is anticipating a dc Friday or Saturday. Pt confirms this.   Will notify Osvaldo MILLIGAN  [phone] phone, fax [phone].   11/06/24 1518   Discharge Assessment   Assessment Type Discharge Planning Assessment   Confirmed/corrected address, phone number and insurance Yes   Confirmed Demographics Correct on Facesheet   Source of Information patient;family   When was your last doctors appointment?   (Cody Khan)   Reason For Admission Aotic valve stenosis   People in Home alone   Facility Arrived From: home   Do you expect to return to your current living situation? Yes   Do you have help at home or someone to help you manage your care at home? Yes   Who are your caregiver(s) and their phone number(s)? pts sister  Liv Chavez [phone] will be assisting pt   Prior to hospitilization cognitive status: Alert/Oriented   Current cognitive status: Alert/Oriented   Walking or Climbing Stairs Difficulty yes  (has rollator at home)   Walking or Climbing Stairs ambulation difficulty, requires equipment   Mobility Management uses a rollator   Home Accessibility wheelchair accessible   Home Layout Able to live on 1st floor   Equipment Currently Used at Home rollator   Readmission within 30 days? No   Patient currently being followed by outpatient case management? No   Do you currently have service(s) that  help you manage your care at home? No   Do you take prescription medications? Yes   Do you have prescription coverage? Yes   Coverage MEDICARE - MEDICARE PART A & B -,[employer] MEDICARE SUPPLEMENT [employer]   Do you have any problems affording any of your prescribed medications? No   Is the patient taking medications as prescribed? yes   Who is going to help you get home at discharge? family   How do you get to doctors appointments? car, drives self;family or friend will provide   Are you on dialysis? No   Discharge Plan A Home;Home Health   Discharge Plan B Home with family;Home Health   DME Needed Upon Discharge  none  (has rollator , lift chair and tub bench)   Discharge Plan discussed with: Patient;Sibling  (family)   Name(s) and Number(s) sister  Liv Chavez [phone]   Physical Activity   On average, how many days per week do you engage in moderate to strenuous exercise (like a brisk walk)? 4 days   On average, how many minutes do you engage in exercise at this level? 40 min   Financial Resource Strain   How hard is it for you to pay for the very basics like food, housing, medical care, and heating? Not hard   Housing Stability   In the last 12 months, was there a time when you were not able to pay the mortgage or rent on time? N   At any time in the past 12 months, were you homeless or living in a shelter (including now)? N   Transportation Needs   Has the lack of transportation kept you from medical appointments, meetings, work or from getting things needed for daily living? No   Food Insecurity   Within the past 12 months, you worried that your food would run out before you got the money to buy more. Never true   Within the past 12 months, the food you bought just didn't last and you didn't have money to get more. Never true   Stress   Do you feel stress - tense, restless, nervous, or anxious, or unable to sleep at night because your mind is troubled all the time - these days? Not at all   Social Isolation   How  often do you feel lonely or isolated from those around you?  Never   Utilities   In the past 12 months has the electric, gas, oil, or water company threatened to shut off services in your home? No   Health Literacy   How often do you need to have someone help you when you read instructions, pamphlets, or other written material from your doctor or pharmacy? Never

## 2024-11-06 NOTE — PROGRESS NOTES
Cardiothoracic Surgery   Daily Progress Note     HD#2  POD#2 Days Post-Op    Subjective  POD 2 s/p AVR, NIDHI ligation  Doing well without issue  Walking around his room this morning  Pain controlled  Cts 120 and 85 cc serosanguinous  Tolerating diet  Voiding  Awaiting floor bed  Afebrile     Scheduled Meds:   aspirin  81 mg Oral Daily    atorvastatin  40 mg Oral Daily    docusate sodium  100 mg Oral BID    enoxparin  40 mg Subcutaneous Daily    famotidine (PF)  20 mg Intravenous Daily    folic acid  1 mg Oral Daily    magnesium oxide  400 mg Oral Nightly    metoprolol tartrate  12.5 mg Oral BID    mupirocin   Nasal BID    sucralfate  1 g Oral QID (AC & HS)       Continuous Infusions:   dexmedeTOMIDine (Precedex) infusion (titrating)  0-1.4 mcg/kg/hr Intravenous Continuous   Stopped at 11/04/24 1130    D5 and 0.45% NaCl   Intravenous Continuous   Stopped at 11/05/24 1119    electrolyte-A   Intravenous Continuous        EPINEPHrine  0-2 mcg/kg/min Intravenous Continuous        insulin regular 1 units/mL infusion orderable (CTS POST-OP)  0-52 Units/hr Intravenous Continuous   Stopped at 11/04/24 1704    loperamide  2 mg Oral Continuous PRN           PRN Meds:  Current Facility-Administered Medications:     acetaminophen, 650 mg, Per OG tube, Q6H PRN    albumin human 5%, 12.5 g, Intravenous, PRN    calcium gluconate IVPB, 1 g, Intravenous, PRN    calcium gluconate IVPB, 2 g, Intravenous, PRN    calcium gluconate IVPB, 3 g, Intravenous, PRN    dextrose 10%, 12.5 g, Intravenous, PRN    dextrose 10%, 25 g, Intravenous, PRN    HYDROcodone-acetaminophen, 1 tablet, Oral, Q4H PRN    lactulose 10 gram/15 ml, 20 g, Oral, Q6H PRN    loperamide, 2 mg, Oral, Continuous PRN    magnesium sulfate IVPB, 2 g, Intravenous, PRN    magnesium sulfate IVPB, 4 g, Intravenous, PRN    metoclopramide, 5 mg, Intravenous, Q6H PRN    morphine, 4 mg, Intravenous, Q4H PRN    ondansetron, 4 mg, Intravenous, Q4H PRN    oxyCODONE, 10 mg, Oral, Q4H PRN     pantoprazole, 20 mg, Oral, PRN    potassium chloride in water, 20 mEq, Intravenous, PRN    potassium chloride in water, 60 mEq, Intravenous, PRN    potassium chloride in water, 40 mEq, Intravenous, PRN     Objective  Temp:  [98.5 °F (36.9 °C)-99.4 °F (37.4 °C)] 99.1 °F (37.3 °C)  Pulse:  [] 92  Resp:  [7-34] 24  SpO2:  [87 %-97 %] 95 %  BP: ()/(66-99) 144/98     I/O last 3 completed shifts:  In: 2904.7 [I.V.:2615.1; IV Piggyback:289.6]  Out: 1369 [Urine:960; Chest Tube:409]  No intake/output data recorded.     GEN: Nad, in chair  NEURO: AAOx3  RESP: No increased WOB, on RA  CV: RR, Cts x 2 serosanguinous to water seal, sternotomy incision c/d/i  ABD: S, NT, ND, no guarding or rebound  : Dennis in place and clear  EXT: moving all spontaneously     Labs  Recent Labs     11/04/24  1002 11/04/24  1011 11/04/24  1211 11/04/24  1507 11/05/24  0322 11/06/24  0605   WBC 14.62*  --  16.83*  --  13.08* 16.24*   HGB 13.5*  --  11.8* 11.6* 11.3* 11.2*   HCT 41.5*   < > 34.9* 34.1* 34.0* 34.3*     --  171  --  202 197   INR 1.4*  --   --  1.3  --   --     < > = values in this interval not displayed.     Recent Labs     11/04/24  1002 11/04/24  1211 11/04/24  1507 11/05/24  0322 11/06/24  0605    140  --  134* 135*   K 3.6 4.6 3.8 4.2 4.3   * 106  --  106 101   CO2 22* 24  --  22* 25   BUN 15.0 13.9  --  12.9 13.2   CREATININE 0.66* 0.67*  --  0.69* 0.71*   CALCIUM 10.2* 8.3*  --  7.8* 8.2*   MG  --  2.60  --   --   --    PHOS  --  3.3  --   --   --    ALBUMIN  --  3.1*  --  3.6 3.3*   BILITOT  --  1.7*  --  1.5 1.8*   AST  --  35*  --  38* 34   ALKPHOS  --  38*  --  36* 48   ALT  --  15  --  15 14       Imaging  X-Ray Chest AP Portable   Final Result      Mild left lower lung atelectasis.         Electronically signed by: Ricky Vásquez   Date:    11/06/2024   Time:    06:04      X-Ray Chest AP Portable   Final Result      Interval removal of endotracheal tube and nasogastric tube.      Increased  left retrocardiac density and silhouetting of the left hemidiaphragm which might be related to an infiltrate/atelectasis.      No other significant change         Electronically signed by: Dustin Hernandez   Date:    11/05/2024   Time:    05:24      X-Ray Chest AP Portable   Final Result      Postoperative changes         Electronically signed by: Dustin Hernandez   Date:    11/04/2024   Time:    12:42             Assessment/Plan  69 M POD 2 AVR and NIDHI ligation    Labs and imaging reviewed.    [facility]  [facility]s to be [facility]  Daily CXR  Strict I&Os  ASA/BB/statin  Bowel regimen  DVT ppx  Therapy, cardiac rehab  Out of bed as tolerated  Awaiting floor bed    Gal Yip MD  Surgery PGY 4      11/6/2024  6:55 AM

## 2024-11-07 VITALS
TEMPERATURE: 98 F | BODY MASS INDEX: 36.03 KG/M2 | OXYGEN SATURATION: 97 % | DIASTOLIC BLOOD PRESSURE: 72 MMHG | SYSTOLIC BLOOD PRESSURE: 113 MMHG | HEIGHT: 66 IN | HEART RATE: 84 BPM | RESPIRATION RATE: 18 BRPM | WEIGHT: 224.19 LBS

## 2024-11-07 LAB
ALBUMIN SERPL-MCNC: 2.9 G/DL (ref 3.4–4.8)
ALBUMIN/GLOB SERPL: 1 RATIO (ref 1.1–2)
ALP SERPL-CCNC: 48 UNIT/L (ref 40–150)
ALT SERPL-CCNC: 15 UNIT/L (ref 0–55)
ANION GAP SERPL CALC-SCNC: 7 MEQ/L
AST SERPL-CCNC: 29 UNIT/L (ref 5–34)
BILIRUB SERPL-MCNC: 1.5 MG/DL
BUN SERPL-MCNC: 13.9 MG/DL (ref 8.4–25.7)
CALCIUM SERPL-MCNC: 8.3 MG/DL (ref 8.8–10)
CHLORIDE SERPL-SCNC: 101 MMOL/L (ref 98–107)
CO2 SERPL-SCNC: 24 MMOL/L (ref 23–31)
CREAT SERPL-MCNC: 0.64 MG/DL (ref 0.72–1.25)
CREAT/UREA NIT SERPL: 22
ERYTHROCYTE [DISTWIDTH] IN BLOOD BY AUTOMATED COUNT: 13.5 % (ref 11.5–17)
GFR SERPLBLD CREATININE-BSD FMLA CKD-EPI: >60 ML/MIN/1.73/M2
GLOBULIN SER-MCNC: 3 GM/DL (ref 2.4–3.5)
GLUCOSE SERPL-MCNC: 99 MG/DL (ref 82–115)
HCT VFR BLD AUTO: 30.9 % (ref 42–52)
HGB BLD-MCNC: 10.4 G/DL (ref 14–18)
MCH RBC QN AUTO: 30 PG (ref 27–31)
MCHC RBC AUTO-ENTMCNC: 33.7 G/DL (ref 33–36)
MCV RBC AUTO: 89 FL (ref 80–94)
NRBC BLD AUTO-RTO: 0 %
PLATELET # BLD AUTO: 192 X10(3)/MCL (ref 130–400)
PMV BLD AUTO: 9.2 FL (ref 7.4–10.4)
POCT GLUCOSE: 112 MG/DL (ref 70–110)
POTASSIUM SERPL-SCNC: 3.8 MMOL/L (ref 3.5–5.1)
PROT SERPL-MCNC: 5.9 GM/DL (ref 5.8–7.6)
RBC # BLD AUTO: 3.47 X10(6)/MCL (ref 4.7–6.1)
SODIUM SERPL-SCNC: 132 MMOL/L (ref 136–145)
WBC # BLD AUTO: 12.5 X10(3)/MCL (ref 4.5–11.5)

## 2024-11-07 PROCEDURE — 36415 COLL VENOUS BLD VENIPUNCTURE: CPT | Performed by: PHYSICIAN ASSISTANT

## 2024-11-07 PROCEDURE — 94760 N-INVAS EAR/PLS OXIMETRY 1: CPT

## 2024-11-07 PROCEDURE — 97110 THERAPEUTIC EXERCISES: CPT

## 2024-11-07 PROCEDURE — 21400001 HC TELEMETRY ROOM

## 2024-11-07 PROCEDURE — 25000003 PHARM REV CODE 250: Performed by: PHYSICIAN ASSISTANT

## 2024-11-07 PROCEDURE — 85027 COMPLETE CBC AUTOMATED: CPT | Performed by: PHYSICIAN ASSISTANT

## 2024-11-07 PROCEDURE — 99024 POSTOP FOLLOW-UP VISIT: CPT | Mod: POP,,, | Performed by: PHYSICIAN ASSISTANT

## 2024-11-07 PROCEDURE — 80053 COMPREHEN METABOLIC PANEL: CPT | Performed by: PHYSICIAN ASSISTANT

## 2024-11-07 RX ORDER — METOPROLOL TARTRATE 25 MG/1
12.5 TABLET, FILM COATED ORAL 2 TIMES DAILY
Qty: 90 TABLET | Refills: 3 | Status: SHIPPED | OUTPATIENT
Start: 2024-11-07 | End: 2025-11-07

## 2024-11-07 RX ORDER — OXYCODONE HYDROCHLORIDE 10 MG/1
5 TABLET ORAL EVERY 4 HOURS PRN
Qty: 40 TABLET | Refills: 0 | Status: SHIPPED | OUTPATIENT
Start: 2024-11-07

## 2024-11-07 RX ORDER — FUROSEMIDE 20 MG/1
20 TABLET ORAL DAILY
Qty: 3 TABLET | Refills: 0 | Status: SHIPPED | OUTPATIENT
Start: 2024-11-07 | End: 2024-11-10

## 2024-11-07 RX ADMIN — ASPIRIN 81 MG: 81 TABLET, COATED ORAL at 08:11

## 2024-11-07 RX ADMIN — SUCRALFATE 1 G: 1 TABLET ORAL at 06:11

## 2024-11-07 RX ADMIN — ATORVASTATIN CALCIUM 40 MG: 40 TABLET, FILM COATED ORAL at 08:11

## 2024-11-07 RX ADMIN — FOLIC ACID 1 MG: 1 TABLET ORAL at 08:11

## 2024-11-07 RX ADMIN — FAMOTIDINE 20 MG: 10 INJECTION, SOLUTION INTRAVENOUS at 08:11

## 2024-11-07 RX ADMIN — METOPROLOL TARTRATE 12.5 MG: 25 TABLET, FILM COATED ORAL at 08:11

## 2024-11-07 RX ADMIN — DOCUSATE SODIUM 100 MG: 100 CAPSULE, LIQUID FILLED ORAL at 08:11

## 2024-11-07 NOTE — PLAN OF CARE
Problem: Adult Inpatient Plan of Care  Goal: Plan of Care Review  Outcome: Adequate for Care Transition  Goal: Patient-Specific Goal (Individualized)  Outcome: Adequate for Care Transition  Goal: Absence of Hospital-Acquired Illness or Injury  Outcome: Adequate for Care Transition  Goal: Optimal Comfort and Wellbeing  Outcome: Adequate for Care Transition  Goal: Readiness for Transition of Care  Outcome: Adequate for Care Transition     Problem: Wound  Goal: Optimal Coping  Outcome: Adequate for Care Transition  Goal: Optimal Functional Ability  Outcome: Adequate for Care Transition  Goal: Absence of Infection Signs and Symptoms  Outcome: Adequate for Care Transition  Goal: Improved Oral Intake  Outcome: Adequate for Care Transition  Goal: Optimal Pain Control and Function  Outcome: Adequate for Care Transition  Goal: Skin Health and Integrity  Outcome: Adequate for Care Transition  Goal: Optimal Wound Healing  Outcome: Adequate for Care Transition     Problem: Infection  Goal: Absence of Infection Signs and Symptoms  Outcome: Adequate for Care Transition     Problem: Fall Injury Risk  Goal: Absence of Fall and Fall-Related Injury  Outcome: Adequate for Care Transition

## 2024-11-07 NOTE — PLAN OF CARE
11/07/24 1208   Final Note   Assessment Type Final Discharge Note   Anticipated Discharge Disposition Home-Health   Post-Acute Status   Post-Acute Authorization Home Health     Discharge with [facility]. Ketty notified of discharge.

## 2024-11-07 NOTE — DISCHARGE INSTRUCTIONS
HOME CARE INSTRUCTIONS AFTER  OPEN HEART SURGERY    WHAT TO DO:  · Eat whatever appeals to you for the first 2 weeks at home. Do not limit your diet when you have no appetite.  · Continue to use the Incentive Spirometer (breathing exercises) 10 times every hour while youre awake for at least 2 weeks.  · Elevate legs above chest, while lying flat, 4 times a day for at least 30 minutes.  o Do so more often if necessary to decrease swelling.  o Recliners do not allow for proper elevation, beds or couches are recommended.  · Wear compression stockings during the day until you are up and walking frequently and you have no real swelling (usually about 2 weeks)  · Shower daily, use mild bath soap (dial or safeguard) on incisions and rinse well. Pat dry and leave open to air.  o Inspect incisions daily. Notify surgeon of any redness, swelling, drainage or opening of the incision.  · Walk frequently throughout the day as tolerated.  o Try not to sit for longer than 2 hour periods at a time.    WHAT TO AVOID:  · NO STRAINING, LIFTING, PULLING OR PUSHING >5-10 POUNDS FOR 8 WEEKS.  · No hydrogen peroxide, ointment, creams, or powders on incisions unless specifically instructed by surgeon. SOAP AND WATER ONLY.  · NO DRIVING UNTIL CLEARED BY SURGEON.    REPORT TO SURGEON  · Any increased redness, swelling, thick/cloudy yellow/greenish drainage, or opening of incision.  · Any fever above 100.2  · If you hear or feel any tingling, clicking, or popping in chest.  · Weigh yourself EVERY morning AFTER urinating, BEFORE eating and wearing the same amount of clothes.  · Experiencing new/continuous shortness of breath, fast/irregular heartbeat, vomiting, vision or speech changes/disturbances, confusion, or unusual bleeding.

## 2024-11-07 NOTE — PROGRESS NOTES
11/07/24 0950   Pre Exercise Vitals   /70   Pulse 98   Supplemental O2? No   SpO2 95 %   Pain - Pre exercise 0   During Exercise Vitals   Pulse 98   Supplemental O2? No   SpO2 95 %   Distance Walked 250   Post Exercise Vitals   /76   Pulse 100   Supplemental O2? No   SpO2 98 %   Pain - Post exercise 0   Modality   Modality   (rollator)       Communicated with nurse prior to and post activity. Minimal assist to stand, maintains sternal precautions. Up to chair pre and post activity. Encouraged increased activity and use of IS.

## 2024-11-07 NOTE — NURSING
AVS virtually reviewed with patient in its entirety with emphasis on diet, medications, follow-up appointments and reasons to return to the ED or contact the [facility] On Call Nurse Care Line. Patient also encouraged to utilize their patient portal. Ease and convenience of use reiterated. Education complete and patient and sister voiced understanding. All questions answered. Discharge teaching complete

## 2024-11-08 RX ORDER — DOCUSATE SODIUM 100 MG
200 CAPSULE ORAL
Status: DISCONTINUED | OUTPATIENT
Start: 2024-11-08 | End: 2024-11-08 | Stop reason: HOSPADM

## 2024-11-08 NOTE — DISCHARGE SUMMARY
[facility] - 6th Floor Medical Telemetry  Cardiothoracic Surgery  Discharge Summary      Patient Name: Lito Durham  MRN: [id number]  Admission Date: 11/4/2024  Hospital Length of Stay: 4 days  Discharge Date and Time:   Attending Physician: Ary Munoz MD   Discharging Provider: Eugene Eldridge PA-C  Primary Care Provider: Cody Khan MD    HPI:   No notes on file    Procedure(s) (LRB):  Replacement-valve-aortic (N/A)  PLATING, STERNAL (N/A)  Left atrial appendage closure device (N/A)      Indwelling Lines/Drains at time of discharge:   Lines/Drains/Airways       None                 Hospital Course: No notes on file    Goals of Care Treatment Preferences:  Code Status: Full Code      Consults (From admission, onward)          Status Ordering Provider     Inpatient consult to Social Work/Case Management  Once        Provider:  (Not yet assigned)    Acknowledged ARY MUNOZ            Significant Diagnostic Studies: Labs: BMP:   Recent Labs   Lab 11/07/24  0347   *   K 3.8      CO2 24   BUN 13.9   CREATININE 0.64*   CALCIUM 8.3*    and CBC   Recent Labs   Lab 11/07/24  0347   WBC 12.50*   HGB 10.4*   HCT 30.9*          Pending Diagnostic Studies:       Procedure Component Value Units Date/Time    Transesophageal echo (CHAYO) [[id number]]     Order Status: Sent Lab Status: No result             No new Assessment & Plan notes have been filed under this hospital service since the last note was generated.  Service: Cardiovascular Surgery    Final Active Diagnoses:    Diagnosis Date Noted POA    PRINCIPAL PROBLEM:  Severe aortic stenosis [I35.0] 08/06/2024 Yes      Problems Resolved During this Admission:      Discharged Condition: good    Disposition: Home or Self Care    Follow Up:   Follow-up Information       Ary Munoz MD Follow up on 11/21/2024.    Specialty: Cardiothoracic Surgery  Why: @9:20  Contact information:  [address]  [address]  [facility]  [address]  [phone]               Cody Khan MD Follow up on 11/14/2024.    Specialty: Family Medicine  Why: @11:20  Contact information:  [address]  [place] [address]  [phone]               Rios Paulino MD Follow up on 11/15/2024.    Specialty: Cardiology  Why: @10:45 with NP  Contact information:  [place] [address]  [phone]               [facility] Follow up.    Specialties: Home Health Services, Home Therapy Services  Why: This is your home health agency.  Contact information:  [address]  [facility] [address]  [phone]                         Patient Instructions:   No discharge procedures on file.  Medications:  Reconciled Home Medications:      Medication List        START taking these medications      furosemide 20 MG tablet  Commonly known as: LASIX  Take 1 tablet (20 mg total) by mouth once daily. for 3 days     metoprolol tartrate 25 MG tablet  Commonly known as: LOPRESSOR  Take 0.5 tablets (12.5 mg total) by mouth 2 (two) times daily.     oxyCODONE 10 mg Tab immediate release tablet  Commonly known as: ROXICODONE  Take 0.5 tablets (5 mg total) by mouth every 4 (four) hours as needed for Pain.            CONTINUE taking these medications      aspirin 81 MG EC tablet  Commonly known as: ECOTRIN  Take 81 mg by mouth once daily. Last dose 10/29 0800     krill oil 500 mg Cap  Take 1 capsule by mouth twice a week.     lisinopriL 10 MG tablet  Take 10 mg by mouth once daily. Last dose: 11/3 at 0800     magnesium 200 mg Tab  Take 2 tablets by mouth nightly.     MUCUS RELIEF 400 mg Tab  Generic drug: guaiFENesin  Take 400 mg by mouth daily as needed.     ONE DAILY MULTIVITAMIN per tablet  Generic drug: multivitamin  Take 1 tablet by mouth every other day.     pantoprazole 20 MG tablet  Commonly known as: PROTONIX  Take 20 mg by mouth as needed (reflux, indigestion). Last dose 11/3 at 2200     rosuvastatin 5 MG  tablet  Commonly known as: CRESTOR  Take 5 mg by mouth once daily. Last dose: 11/3 at 2200     VITAMIN B-12 500 MCG tablet  Generic drug: cyanocobalamin  Take 500 mcg by mouth. Once a week     zinc gluconate 50 mg tablet  Take 50 mg by mouth once a week. W/Vit C            STOP taking these medications      mupirocin 2 % ointment  Commonly known as: BACTROBAN     OTEZLA 30 mg Tab  Generic drug: apremilast            Time spent on the discharge of patient: 30 minutes    Eugene Eldridge PA-C  Cardiothoracic Surgery  [facility] - 6th Floor Medical Telemetry

## 2024-11-11 PROCEDURE — G0180 MD CERTIFICATION HHA PATIENT: HCPCS | Mod: ,,, | Performed by: THORACIC SURGERY (CARDIOTHORACIC VASCULAR SURGERY)

## 2024-11-12 ENCOUNTER — PATIENT MESSAGE (OUTPATIENT)
Dept: ADMINISTRATIVE | Facility: CLINIC | Age: 69
End: 2024-11-12
Payer: MEDICARE

## 2024-11-12 ENCOUNTER — PATIENT OUTREACH (OUTPATIENT)
Dept: ADMINISTRATIVE | Facility: CLINIC | Age: 69
End: 2024-11-12
Payer: MEDICARE

## 2024-11-12 NOTE — PROGRESS NOTES
C3 nurse attempted to contact Lito Durham  for a TCC post hospital discharge follow up call. No answer. Left voicemail with callback information. The patient has a scheduled HOS appointment with Cody Khan MD  on 11/14/2024 @ 1120 am.   Appointment with Dr. Eldon Munoz on 11/21/2024 @ 920 am.    Message sent via patient's portal regarding follow up call.

## 2024-11-12 NOTE — PROGRESS NOTES
C3 nurse attempted to contact Lito Durham  for a TCC post hospital discharge follow up call. No answer. Left voicemail with callback information. The patient has a scheduled HOS appointment with Cody Khan MD  on 11/14/2024 @ 1120 am.   Appointment with Dr. Eldon Munoz on 11/21/2024 @ 920 am.

## 2024-11-13 NOTE — PROGRESS NOTES
C3 nurse attempted to contact Lito Durham for a TCC post hospital discharge follow up call. No answer. Left voicemail.

## 2024-11-21 ENCOUNTER — OFFICE VISIT (OUTPATIENT)
Dept: CARDIAC SURGERY | Facility: CLINIC | Age: 69
End: 2024-11-21
Payer: MEDICARE

## 2024-11-21 VITALS
WEIGHT: 217 LBS | HEART RATE: 80 BPM | SYSTOLIC BLOOD PRESSURE: 115 MMHG | DIASTOLIC BLOOD PRESSURE: 82 MMHG | BODY MASS INDEX: 35.02 KG/M2 | OXYGEN SATURATION: 98 %

## 2024-11-21 DIAGNOSIS — Z95.2 S/P AVR (AORTIC VALVE REPLACEMENT): Primary | ICD-10-CM

## 2024-11-21 PROCEDURE — 99024 POSTOP FOLLOW-UP VISIT: CPT | Mod: POP,,, | Performed by: THORACIC SURGERY (CARDIOTHORACIC VASCULAR SURGERY)

## 2024-12-02 PROBLEM — Z95.2 S/P AVR (AORTIC VALVE REPLACEMENT): Status: ACTIVE | Noted: 2024-12-02

## 2024-12-02 NOTE — PROGRESS NOTES
Patient is status post cAVR (B,23)doing well without complaints   Vital signs stable/afebrile   Regular rate and rhythm without murmurs rubs or gallops  Coarse breath sounds bilaterally   Wounds CDI   Echocardiogram reviewed   A/P:  Doing well without complaints.  Plan per Cardiology

## 2024-12-05 ENCOUNTER — EXTERNAL HOME HEALTH (OUTPATIENT)
Dept: HOME HEALTH SERVICES | Facility: HOSPITAL | Age: 69
End: 2024-12-05
Payer: MEDICARE

## 2024-12-10 ENCOUNTER — DOCUMENT SCAN (OUTPATIENT)
Dept: HOME HEALTH SERVICES | Facility: HOSPITAL | Age: 69
End: 2024-12-10
Payer: MEDICARE

## 2025-02-06 ENCOUNTER — DOCUMENT SCAN (OUTPATIENT)
Dept: HOME HEALTH SERVICES | Facility: HOSPITAL | Age: 70
End: 2025-02-06
Payer: MEDICARE

## 2025-02-25 ENCOUNTER — DOCUMENT SCAN (OUTPATIENT)
Dept: HOME HEALTH SERVICES | Facility: HOSPITAL | Age: 70
End: 2025-02-25
Payer: MEDICARE

## 2025-05-22 ENCOUNTER — OFFICE VISIT (OUTPATIENT)
Dept: CARDIAC SURGERY | Facility: CLINIC | Age: 70
End: 2025-05-22
Payer: MEDICARE

## 2025-05-22 VITALS
SYSTOLIC BLOOD PRESSURE: 120 MMHG | OXYGEN SATURATION: 97 % | HEART RATE: 74 BPM | WEIGHT: 216.94 LBS | BODY MASS INDEX: 34.86 KG/M2 | HEIGHT: 66 IN | DIASTOLIC BLOOD PRESSURE: 78 MMHG

## 2025-05-22 DIAGNOSIS — Z95.2 S/P AVR (AORTIC VALVE REPLACEMENT): Primary | ICD-10-CM

## 2025-05-22 PROCEDURE — 99214 OFFICE O/P EST MOD 30 MIN: CPT | Mod: ,,, | Performed by: THORACIC SURGERY (CARDIOTHORACIC VASCULAR SURGERY)

## 2025-05-29 NOTE — PROGRESS NOTES
"Nosebleed-A-OH    " Right commonPatient is status post cAVR (B,23)doing well without complaints   Vital signs stable/afebrile   Regular rate and rhythm without murmurs rubs or gallops  Coarse breath sounds bilaterally   Wounds CDI   Echocardiogram reviewed   A/P:  Doing well without complaints.  Plan per Cardiology

## (undated) DEVICE — SUT BONE WAX 2.5 GRMS 12/BX

## (undated) DEVICE — CARTRIDGE SILV 4CHAN 2.0-3.5MG

## (undated) DEVICE — CLAMP TOWEL EASY RELEASE TAB

## (undated) DEVICE — GLOVE PROTEXIS NEOPRN SZ8

## (undated) DEVICE — PAD DEFIB CADENCE ADULT R2

## (undated) DEVICE — Device

## (undated) DEVICE — SHEATH INTRODUCER 5FR 10CM

## (undated) DEVICE — DRAIN JACKSON PRATT TRCR 19FR

## (undated) DEVICE — DEVICE COR KNOT MINI COMBO KIT

## (undated) DEVICE — GLOVE BIOGEL 7.5

## (undated) DEVICE — WIRE INTRAMYOCARDIAL TEMP

## (undated) DEVICE — SHEATH INTRODUCER 6FR 11CM

## (undated) DEVICE — ELECTRODE BLADE INSULATED 1 IN

## (undated) DEVICE — CATH KARDIA PERI HI FLO 24FR

## (undated) DEVICE — SUT ETHBND XTRA 1 OS-8 30IN

## (undated) DEVICE — GOWN SMARTSLEEVE AAMI LVL4 XXL

## (undated) DEVICE — HEMOSTAT SURGICEL NU-KNIT 6X9

## (undated) DEVICE — SUT V-5 GRN WHT 2/0 10X30IN

## (undated) DEVICE — CARTRIDGE HEPARIN 2 CHNNL ACT

## (undated) DEVICE — TRAY CATH FOL SIL TEMP 10 16FR

## (undated) DEVICE — SYR LUER LOCK STERILE 10ML

## (undated) DEVICE — CANNULA PERF ART RA 8X3.5MM

## (undated) DEVICE — SUT MONO 3-0 PS-2 18 PLST

## (undated) DEVICE — HEMOCONCENTRATOR PED .09M2

## (undated) DEVICE — APPLICATOR CHLORAPREP ORN 26ML

## (undated) DEVICE — DRAPE SLUSH WARMER WITH DISC

## (undated) DEVICE — GOWN ECLIPSE REINF LVL4 TWL XL

## (undated) DEVICE — SUT PROLENE 5-0 36IN C-1

## (undated) DEVICE — GLOVE PROTEXIS BLUE LATEX 7

## (undated) DEVICE — DRAIN CHEST DRY SUCTION

## (undated) DEVICE — SUT PROLENE 3-0 36 MHMH

## (undated) DEVICE — SUT 2/0 36IN ETHIBOND EXCE

## (undated) DEVICE — SUT VICRYL 2-0 36 CT-1

## (undated) DEVICE — DRESSING TELFA + BARR 4X6IN

## (undated) DEVICE — SOL NORMAL USPCA 0.9%

## (undated) DEVICE — GLOVE SURG BIOGEL LATEX SZ 7.5

## (undated) DEVICE — KIT SURGICAL TURNOVER

## (undated) DEVICE — SUT SILK 2-0 SH 18IN BLACK

## (undated) DEVICE — SUT SILK 2-0 BLK BR KS 30 I

## (undated) DEVICE — SOL NACL IRR 1000ML BTL

## (undated) DEVICE — CANNULA SOFT FLOW ANG 14IN 21F

## (undated) DEVICE — INSERT INTRACK CLAMP ULT 66MM

## (undated) DEVICE — CATH 5FR AR2.0 5/BX

## (undated) DEVICE — GUIDEWIRE STF .035X180CM STR

## (undated) DEVICE — KIT C.A.T.S. FAST START

## (undated) DEVICE — SOL PLASMALYTE PH 7.4 1000ML

## (undated) DEVICE — SOL LAC RINGERS 1000ML INJ

## (undated) DEVICE — GUIDEWIRE J TIP FIXED 25X260CM

## (undated) DEVICE — SUT VICRYL 1 OB 36 CTX

## (undated) DEVICE — DRAPE STERI INCISE 23X23IN

## (undated) DEVICE — CARTRIDGE HEPARIN DOSE

## (undated) DEVICE — SUT PROLENE 6-0 C-1 30IN BL

## (undated) DEVICE — CATH PIG145 LANGSTON 6FR 110CM

## (undated) DEVICE — SHEATH INTRODUCER 7FR 11CM

## (undated) DEVICE — ADHESIVE DERMABOND ADVANCED

## (undated) DEVICE — SENSOR LOW LEVEL OXYGEN

## (undated) DEVICE — YANKAUER OPEN TIP W/O VENT

## (undated) DEVICE — CATH EMPULSE ANGLED 5FR PIGTAI

## (undated) DEVICE — SUT PROLENE 4-0 SH BLU 36IN

## (undated) DEVICE — INSERT STEALTH SURGICAL CLAMP

## (undated) DEVICE — PENCIL E-Z CLEAN ROCKER 15FT

## (undated) DEVICE — SYR IRRIGATION BULB STER 60ML

## (undated) DEVICE — BLANKET HYPER ADULT 24X60IN

## (undated) DEVICE — TUBING PRSS MON M LL CONN 72IN

## (undated) DEVICE — INSERT INTRACK CLAMP ULTRA 88M

## (undated) DEVICE — SOL IRRI STRL WATER 1000ML

## (undated) DEVICE — PACK SURG PERF CARDPULM BYPS

## (undated) DEVICE — CATH IMPULSE FL4 5FR 100CM

## (undated) DEVICE — PACK VARISPREED BATTERY

## (undated) DEVICE — GUIDEWIRE INQWIRE SE 3MM JTIP

## (undated) DEVICE — HEMOCONCENTRATOR W TBNG ADPT

## (undated) DEVICE — SOL .9NACL PF 100 ML

## (undated) DEVICE — DRESSING TEGADERM CHG 3.5X4.5

## (undated) DEVICE — CANNULA MC2 OVAL NVENT 32/40FR

## (undated) DEVICE — BLADE SURGICAL SAFELOCK #10 ST

## (undated) DEVICE — SOL ELECTROLYTE PH 7.4 500ML

## (undated) DEVICE — DRAIN CHEST WATER SEAL

## (undated) DEVICE — DEVICE CLSR ATRICLIP FLEX 40MM

## (undated) DEVICE — CANNULA AORT ROOT VNT LN 14GA

## (undated) DEVICE — GLOVE PROTEXIS PI SYN SURG 7.5

## (undated) DEVICE — SEALANT VISTASEAL FIBRIN 10ML

## (undated) DEVICE — SET PERFUSION

## (undated) DEVICE — STOPCOCK 4-WAY

## (undated) DEVICE — PLEDGET TFLN FELT 9.5X4.8 ST

## (undated) DEVICE — SUT MONOCRYL PLUS UD 3-0 27

## (undated) DEVICE — HOLDER STRIP-T SELF ADH 2X10IN

## (undated) DEVICE — SUT PROLENE 4-0 RB-1 BL MO

## (undated) DEVICE — ELECTRODE PATIENT RETURN DISP

## (undated) DEVICE — CATH IMPULSE 5F 100CM FR4

## (undated) DEVICE — INQWIRE 210

## (undated) DEVICE — BOWL STERILE LG GRAD 32OZ

## (undated) DEVICE — SOL IRR NACL .9% 3000ML

## (undated) DEVICE — DRESSING TELFA + RECT 6X10IN

## (undated) DEVICE — GLOVE PROTEXIS HYDROGEL SZ6.5

## (undated) DEVICE — CATH SWAN GANZ STND 7FR